# Patient Record
Sex: FEMALE | Race: WHITE | NOT HISPANIC OR LATINO | Employment: OTHER | URBAN - METROPOLITAN AREA
[De-identification: names, ages, dates, MRNs, and addresses within clinical notes are randomized per-mention and may not be internally consistent; named-entity substitution may affect disease eponyms.]

---

## 2017-11-07 ENCOUNTER — HOSPITAL ENCOUNTER (OUTPATIENT)
Facility: MEDICAL CENTER | Age: 72
End: 2017-11-09
Attending: EMERGENCY MEDICINE | Admitting: INTERNAL MEDICINE
Payer: MEDICARE

## 2017-11-07 ENCOUNTER — RESOLUTE PROFESSIONAL BILLING HOSPITAL PROF FEE (OUTPATIENT)
Dept: HOSPITALIST | Facility: MEDICAL CENTER | Age: 72
End: 2017-11-07
Payer: MEDICARE

## 2017-11-07 ENCOUNTER — APPOINTMENT (OUTPATIENT)
Dept: RADIOLOGY | Facility: MEDICAL CENTER | Age: 72
End: 2017-11-07
Attending: EMERGENCY MEDICINE
Payer: MEDICARE

## 2017-11-07 DIAGNOSIS — G45.8 OTHER SPECIFIED TRANSIENT CEREBRAL ISCHEMIAS: ICD-10-CM

## 2017-11-07 DIAGNOSIS — E86.0 DEHYDRATION: ICD-10-CM

## 2017-11-07 DIAGNOSIS — E87.1 HYPONATREMIA: ICD-10-CM

## 2017-11-07 DIAGNOSIS — R55 SYNCOPE, UNSPECIFIED SYNCOPE TYPE: ICD-10-CM

## 2017-11-07 LAB
ALBUMIN SERPL BCP-MCNC: 4 G/DL (ref 3.2–4.9)
ALBUMIN/GLOB SERPL: 1.4 G/DL
ALP SERPL-CCNC: 46 U/L (ref 30–99)
ALT SERPL-CCNC: 14 U/L (ref 2–50)
ANION GAP SERPL CALC-SCNC: 11 MMOL/L (ref 0–11.9)
APPEARANCE UR: CLEAR
APPEARANCE UR: CLEAR
APTT PPP: 29.3 SEC (ref 24.7–36)
AST SERPL-CCNC: 18 U/L (ref 12–45)
BASOPHILS # BLD AUTO: 0.5 % (ref 0–1.8)
BASOPHILS # BLD: 0.04 K/UL (ref 0–0.12)
BILIRUB SERPL-MCNC: 0.8 MG/DL (ref 0.1–1.5)
BILIRUB UR QL STRIP.AUTO: NEGATIVE
BNP SERPL-MCNC: 12 PG/ML (ref 0–100)
BUN SERPL-MCNC: 27 MG/DL (ref 8–22)
CALCIUM SERPL-MCNC: 8.9 MG/DL (ref 8.5–10.5)
CHLORIDE SERPL-SCNC: 98 MMOL/L (ref 96–112)
CO2 SERPL-SCNC: 18 MMOL/L (ref 20–33)
COLOR UR AUTO: YELLOW
COLOR UR: YELLOW
CREAT SERPL-MCNC: 1.23 MG/DL (ref 0.5–1.4)
EKG IMPRESSION: NORMAL
EOSINOPHIL # BLD AUTO: 0.03 K/UL (ref 0–0.51)
EOSINOPHIL NFR BLD: 0.4 % (ref 0–6.9)
ERYTHROCYTE [DISTWIDTH] IN BLOOD BY AUTOMATED COUNT: 37.2 FL (ref 35.9–50)
EST. AVERAGE GLUCOSE BLD GHB EST-MCNC: 166 MG/DL
GFR SERPL CREATININE-BSD FRML MDRD: 43 ML/MIN/1.73 M 2
GLOBULIN SER CALC-MCNC: 2.8 G/DL (ref 1.9–3.5)
GLUCOSE BLD-MCNC: 209 MG/DL (ref 65–99)
GLUCOSE SERPL-MCNC: 207 MG/DL (ref 65–99)
GLUCOSE UR QL STRIP.AUTO: NEGATIVE MG/DL
GLUCOSE UR STRIP.AUTO-MCNC: NEGATIVE MG/DL
HBA1C MFR BLD: 7.4 % (ref 0–5.6)
HCT VFR BLD AUTO: 31.3 % (ref 37–47)
HGB BLD-MCNC: 11 G/DL (ref 12–16)
IMM GRANULOCYTES # BLD AUTO: 0.04 K/UL (ref 0–0.11)
IMM GRANULOCYTES NFR BLD AUTO: 0.5 % (ref 0–0.9)
INR PPP: 1.06 (ref 0.87–1.13)
KETONES UR QL STRIP.AUTO: 40 MG/DL
KETONES UR STRIP.AUTO-MCNC: 40 MG/DL
LEUKOCYTE ESTERASE UR QL STRIP.AUTO: NEGATIVE
LEUKOCYTE ESTERASE UR QL STRIP.AUTO: NEGATIVE
LIPASE SERPL-CCNC: 26 U/L (ref 11–82)
LYMPHOCYTES # BLD AUTO: 1.04 K/UL (ref 1–4.8)
LYMPHOCYTES NFR BLD: 12.5 % (ref 22–41)
MCH RBC QN AUTO: 30.7 PG (ref 27–33)
MCHC RBC AUTO-ENTMCNC: 35.1 G/DL (ref 33.6–35)
MCV RBC AUTO: 87.4 FL (ref 81.4–97.8)
MICRO URNS: ABNORMAL
MONOCYTES # BLD AUTO: 0.45 K/UL (ref 0–0.85)
MONOCYTES NFR BLD AUTO: 5.4 % (ref 0–13.4)
NEUTROPHILS # BLD AUTO: 6.71 K/UL (ref 2–7.15)
NEUTROPHILS NFR BLD: 80.7 % (ref 44–72)
NITRITE UR QL STRIP.AUTO: NEGATIVE
NITRITE UR QL STRIP.AUTO: NEGATIVE
NRBC # BLD AUTO: 0 K/UL
NRBC BLD AUTO-RTO: 0 /100 WBC
PH UR STRIP.AUTO: 7 [PH]
PH UR STRIP.AUTO: 7 [PH]
PLATELET # BLD AUTO: 296 K/UL (ref 164–446)
PMV BLD AUTO: 9.7 FL (ref 9–12.9)
POTASSIUM SERPL-SCNC: 4.5 MMOL/L (ref 3.6–5.5)
PROT SERPL-MCNC: 6.8 G/DL (ref 6–8.2)
PROT UR QL STRIP: NEGATIVE MG/DL
PROT UR QL STRIP: NEGATIVE MG/DL
PROTHROMBIN TIME: 13.5 SEC (ref 12–14.6)
RBC # BLD AUTO: 3.58 M/UL (ref 4.2–5.4)
RBC UR QL AUTO: NEGATIVE
RBC UR QL AUTO: NEGATIVE
SODIUM SERPL-SCNC: 127 MMOL/L (ref 135–145)
SP GR UR STRIP.AUTO: 1.01
SP GR UR: 1.01
TROPONIN I SERPL-MCNC: 0.02 NG/ML (ref 0–0.04)
TSH SERPL DL<=0.005 MIU/L-ACNC: 0.49 UIU/ML (ref 0.3–3.7)
UROBILINOGEN UR STRIP.AUTO-MCNC: 0.2 MG/DL
WBC # BLD AUTO: 8.3 K/UL (ref 4.8–10.8)

## 2017-11-07 PROCEDURE — 82962 GLUCOSE BLOOD TEST: CPT

## 2017-11-07 PROCEDURE — 700105 HCHG RX REV CODE 258: Performed by: EMERGENCY MEDICINE

## 2017-11-07 PROCEDURE — 81002 URINALYSIS NONAUTO W/O SCOPE: CPT | Mod: 59

## 2017-11-07 PROCEDURE — G0378 HOSPITAL OBSERVATION PER HR: HCPCS

## 2017-11-07 PROCEDURE — 83690 ASSAY OF LIPASE: CPT

## 2017-11-07 PROCEDURE — 85610 PROTHROMBIN TIME: CPT

## 2017-11-07 PROCEDURE — 70450 CT HEAD/BRAIN W/O DYE: CPT

## 2017-11-07 PROCEDURE — 99285 EMERGENCY DEPT VISIT HI MDM: CPT

## 2017-11-07 PROCEDURE — 83880 ASSAY OF NATRIURETIC PEPTIDE: CPT

## 2017-11-07 PROCEDURE — 71010 DX-CHEST-LIMITED (1 VIEW): CPT

## 2017-11-07 PROCEDURE — 84484 ASSAY OF TROPONIN QUANT: CPT

## 2017-11-07 PROCEDURE — 93005 ELECTROCARDIOGRAM TRACING: CPT | Performed by: EMERGENCY MEDICINE

## 2017-11-07 PROCEDURE — 94760 N-INVAS EAR/PLS OXIMETRY 1: CPT

## 2017-11-07 PROCEDURE — 80053 COMPREHEN METABOLIC PANEL: CPT

## 2017-11-07 PROCEDURE — 700102 HCHG RX REV CODE 250 W/ 637 OVERRIDE(OP): Performed by: INTERNAL MEDICINE

## 2017-11-07 PROCEDURE — 36415 COLL VENOUS BLD VENIPUNCTURE: CPT

## 2017-11-07 PROCEDURE — 93005 ELECTROCARDIOGRAM TRACING: CPT

## 2017-11-07 PROCEDURE — 99220 PR INITIAL OBSERVATION CARE,LEVL III: CPT | Mod: AI | Performed by: INTERNAL MEDICINE

## 2017-11-07 PROCEDURE — 83036 HEMOGLOBIN GLYCOSYLATED A1C: CPT

## 2017-11-07 PROCEDURE — 85025 COMPLETE CBC W/AUTO DIFF WBC: CPT

## 2017-11-07 PROCEDURE — 700111 HCHG RX REV CODE 636 W/ 250 OVERRIDE (IP): Performed by: INTERNAL MEDICINE

## 2017-11-07 PROCEDURE — 84443 ASSAY THYROID STIM HORMONE: CPT

## 2017-11-07 PROCEDURE — 85730 THROMBOPLASTIN TIME PARTIAL: CPT

## 2017-11-07 PROCEDURE — 81003 URINALYSIS AUTO W/O SCOPE: CPT

## 2017-11-07 PROCEDURE — 700105 HCHG RX REV CODE 258: Performed by: INTERNAL MEDICINE

## 2017-11-07 PROCEDURE — A9270 NON-COVERED ITEM OR SERVICE: HCPCS | Performed by: INTERNAL MEDICINE

## 2017-11-07 RX ORDER — BUDESONIDE AND FORMOTEROL FUMARATE DIHYDRATE 160; 4.5 UG/1; UG/1
2 AEROSOL RESPIRATORY (INHALATION)
Status: DISCONTINUED | OUTPATIENT
Start: 2017-11-07 | End: 2017-11-09 | Stop reason: HOSPADM

## 2017-11-07 RX ORDER — ATORVASTATIN CALCIUM 10 MG/1
10 TABLET, FILM COATED ORAL EVERY EVENING
Status: DISCONTINUED | OUTPATIENT
Start: 2017-11-07 | End: 2017-11-09 | Stop reason: HOSPADM

## 2017-11-07 RX ORDER — LISINOPRIL 10 MG/1
10 TABLET ORAL DAILY
COMMUNITY
End: 2023-11-09

## 2017-11-07 RX ORDER — SODIUM CHLORIDE 9 MG/ML
1000 INJECTION, SOLUTION INTRAVENOUS ONCE
Status: COMPLETED | OUTPATIENT
Start: 2017-11-07 | End: 2017-11-07

## 2017-11-07 RX ORDER — LEVOCETIRIZINE DIHYDROCHLORIDE 5 MG/1
5 TABLET, FILM COATED ORAL DAILY
COMMUNITY
End: 2021-10-27

## 2017-11-07 RX ORDER — AMOXICILLIN 250 MG
2 CAPSULE ORAL 2 TIMES DAILY
Status: DISCONTINUED | OUTPATIENT
Start: 2017-11-07 | End: 2017-11-09 | Stop reason: HOSPADM

## 2017-11-07 RX ORDER — LEVOTHYROXINE SODIUM 112 UG/1
88 TABLET ORAL
COMMUNITY

## 2017-11-07 RX ORDER — ACETAMINOPHEN 325 MG/1
650 TABLET ORAL EVERY 6 HOURS PRN
Status: DISCONTINUED | OUTPATIENT
Start: 2017-11-07 | End: 2017-11-09 | Stop reason: HOSPADM

## 2017-11-07 RX ORDER — DEXTROSE MONOHYDRATE 25 G/50ML
25 INJECTION, SOLUTION INTRAVENOUS
Status: DISCONTINUED | OUTPATIENT
Start: 2017-11-07 | End: 2017-11-09 | Stop reason: HOSPADM

## 2017-11-07 RX ORDER — HEPARIN SODIUM 5000 [USP'U]/ML
5000 INJECTION, SOLUTION INTRAVENOUS; SUBCUTANEOUS EVERY 8 HOURS
Status: DISCONTINUED | OUTPATIENT
Start: 2017-11-07 | End: 2017-11-09 | Stop reason: HOSPADM

## 2017-11-07 RX ORDER — BISACODYL 10 MG
10 SUPPOSITORY, RECTAL RECTAL
Status: DISCONTINUED | OUTPATIENT
Start: 2017-11-07 | End: 2017-11-09 | Stop reason: HOSPADM

## 2017-11-07 RX ORDER — LEVOTHYROXINE SODIUM 112 UG/1
112 TABLET ORAL
Status: DISCONTINUED | OUTPATIENT
Start: 2017-11-08 | End: 2017-11-09 | Stop reason: HOSPADM

## 2017-11-07 RX ORDER — ATORVASTATIN CALCIUM 10 MG/1
10 TABLET, FILM COATED ORAL EVERY EVENING
COMMUNITY
End: 2023-11-09

## 2017-11-07 RX ORDER — POLYETHYLENE GLYCOL 3350 17 G/17G
1 POWDER, FOR SOLUTION ORAL
Status: DISCONTINUED | OUTPATIENT
Start: 2017-11-07 | End: 2017-11-09 | Stop reason: HOSPADM

## 2017-11-07 RX ORDER — LEVOCETIRIZINE DIHYDROCHLORIDE 5 MG/1
5 TABLET, FILM COATED ORAL DAILY
Status: DISCONTINUED | OUTPATIENT
Start: 2017-11-07 | End: 2017-11-07

## 2017-11-07 RX ORDER — FLUTICASONE PROPIONATE 50 MCG
2 SPRAY, SUSPENSION (ML) NASAL DAILY
COMMUNITY
End: 2021-10-27

## 2017-11-07 RX ORDER — LABETALOL HYDROCHLORIDE 5 MG/ML
10 INJECTION, SOLUTION INTRAVENOUS EVERY 4 HOURS PRN
Status: DISCONTINUED | OUTPATIENT
Start: 2017-11-07 | End: 2017-11-09 | Stop reason: HOSPADM

## 2017-11-07 RX ORDER — SODIUM CHLORIDE 9 MG/ML
INJECTION, SOLUTION INTRAVENOUS CONTINUOUS
Status: DISCONTINUED | OUTPATIENT
Start: 2017-11-07 | End: 2017-11-08

## 2017-11-07 RX ADMIN — ATORVASTATIN CALCIUM 10 MG: 10 TABLET, FILM COATED ORAL at 21:03

## 2017-11-07 RX ADMIN — SODIUM CHLORIDE: 9 INJECTION, SOLUTION INTRAVENOUS at 21:02

## 2017-11-07 RX ADMIN — INSULIN LISPRO 2 UNITS: 100 INJECTION, SOLUTION INTRAVENOUS; SUBCUTANEOUS at 21:33

## 2017-11-07 RX ADMIN — HEPARIN SODIUM 5000 UNITS: 5000 INJECTION, SOLUTION INTRAVENOUS; SUBCUTANEOUS at 21:02

## 2017-11-07 RX ADMIN — SODIUM CHLORIDE 1000 ML: 9 INJECTION, SOLUTION INTRAVENOUS at 17:37

## 2017-11-07 ASSESSMENT — LIFESTYLE VARIABLES
EVER_SMOKED: YES
DO YOU DRINK ALCOHOL: NO
ALCOHOL_USE: NO

## 2017-11-07 ASSESSMENT — COGNITIVE AND FUNCTIONAL STATUS - GENERAL
MOBILITY SCORE: 23
DAILY ACTIVITIY SCORE: 24
CLIMB 3 TO 5 STEPS WITH RAILING: A LITTLE
SUGGESTED CMS G CODE MODIFIER MOBILITY: CI
SUGGESTED CMS G CODE MODIFIER DAILY ACTIVITY: CH

## 2017-11-07 ASSESSMENT — PATIENT HEALTH QUESTIONNAIRE - PHQ9
SUM OF ALL RESPONSES TO PHQ QUESTIONS 1-9: 0
2. FEELING DOWN, DEPRESSED, IRRITABLE, OR HOPELESS: NOT AT ALL
1. LITTLE INTEREST OR PLEASURE IN DOING THINGS: NOT AT ALL
SUM OF ALL RESPONSES TO PHQ9 QUESTIONS 1 AND 2: 0

## 2017-11-07 ASSESSMENT — PAIN SCALES - GENERAL
PAINLEVEL_OUTOF10: 0
PAINLEVEL_OUTOF10: 2
PAINLEVEL_OUTOF10: 0

## 2017-11-08 ENCOUNTER — APPOINTMENT (OUTPATIENT)
Dept: RADIOLOGY | Facility: MEDICAL CENTER | Age: 72
End: 2017-11-08
Attending: PSYCHIATRY & NEUROLOGY
Payer: MEDICARE

## 2017-11-08 PROBLEM — R11.10 VOMITING: Status: ACTIVE | Noted: 2017-11-08

## 2017-11-08 PROBLEM — D32.9 MENINGIOMA (HCC): Status: ACTIVE | Noted: 2017-11-08

## 2017-11-08 LAB
ANION GAP SERPL CALC-SCNC: 6 MMOL/L (ref 0–11.9)
BASOPHILS # BLD AUTO: 0.7 % (ref 0–1.8)
BASOPHILS # BLD: 0.05 K/UL (ref 0–0.12)
BUN SERPL-MCNC: 25 MG/DL (ref 8–22)
CALCIUM SERPL-MCNC: 7.9 MG/DL (ref 8.5–10.5)
CHLORIDE SERPL-SCNC: 109 MMOL/L (ref 96–112)
CO2 SERPL-SCNC: 21 MMOL/L (ref 20–33)
CREAT SERPL-MCNC: 1.24 MG/DL (ref 0.5–1.4)
EOSINOPHIL # BLD AUTO: 0.09 K/UL (ref 0–0.51)
EOSINOPHIL NFR BLD: 1.3 % (ref 0–6.9)
ERYTHROCYTE [DISTWIDTH] IN BLOOD BY AUTOMATED COUNT: 39.6 FL (ref 35.9–50)
GFR SERPL CREATININE-BSD FRML MDRD: 42 ML/MIN/1.73 M 2
GLUCOSE BLD-MCNC: 104 MG/DL (ref 65–99)
GLUCOSE BLD-MCNC: 151 MG/DL (ref 65–99)
GLUCOSE SERPL-MCNC: 93 MG/DL (ref 65–99)
HCT VFR BLD AUTO: 28.8 % (ref 37–47)
HGB BLD-MCNC: 9.6 G/DL (ref 12–16)
IMM GRANULOCYTES # BLD AUTO: 0.02 K/UL (ref 0–0.11)
IMM GRANULOCYTES NFR BLD AUTO: 0.3 % (ref 0–0.9)
LYMPHOCYTES # BLD AUTO: 1.8 K/UL (ref 1–4.8)
LYMPHOCYTES NFR BLD: 25.7 % (ref 22–41)
MCH RBC QN AUTO: 30.4 PG (ref 27–33)
MCHC RBC AUTO-ENTMCNC: 33.3 G/DL (ref 33.6–35)
MCV RBC AUTO: 91.1 FL (ref 81.4–97.8)
MONOCYTES # BLD AUTO: 0.66 K/UL (ref 0–0.85)
MONOCYTES NFR BLD AUTO: 9.4 % (ref 0–13.4)
NEUTROPHILS # BLD AUTO: 4.39 K/UL (ref 2–7.15)
NEUTROPHILS NFR BLD: 62.6 % (ref 44–72)
NRBC # BLD AUTO: 0 K/UL
NRBC BLD AUTO-RTO: 0 /100 WBC
PLATELET # BLD AUTO: 288 K/UL (ref 164–446)
PMV BLD AUTO: 9.8 FL (ref 9–12.9)
POTASSIUM SERPL-SCNC: 4.1 MMOL/L (ref 3.6–5.5)
RBC # BLD AUTO: 3.16 M/UL (ref 4.2–5.4)
SODIUM SERPL-SCNC: 136 MMOL/L (ref 135–145)
WBC # BLD AUTO: 7 K/UL (ref 4.8–10.8)

## 2017-11-08 PROCEDURE — 99226 PR SUBSEQUENT OBSERVATION CARE,LEVEL III: CPT | Performed by: INTERNAL MEDICINE

## 2017-11-08 PROCEDURE — 80048 BASIC METABOLIC PNL TOTAL CA: CPT

## 2017-11-08 PROCEDURE — 700111 HCHG RX REV CODE 636 W/ 250 OVERRIDE (IP): Performed by: INTERNAL MEDICINE

## 2017-11-08 PROCEDURE — 93880 EXTRACRANIAL BILAT STUDY: CPT | Mod: 26 | Performed by: SURGERY

## 2017-11-08 PROCEDURE — 82962 GLUCOSE BLOOD TEST: CPT

## 2017-11-08 PROCEDURE — 93880 EXTRACRANIAL BILAT STUDY: CPT

## 2017-11-08 PROCEDURE — 700102 HCHG RX REV CODE 250 W/ 637 OVERRIDE(OP): Performed by: INTERNAL MEDICINE

## 2017-11-08 PROCEDURE — 85025 COMPLETE CBC W/AUTO DIFF WBC: CPT

## 2017-11-08 PROCEDURE — A9270 NON-COVERED ITEM OR SERVICE: HCPCS | Performed by: INTERNAL MEDICINE

## 2017-11-08 PROCEDURE — 700105 HCHG RX REV CODE 258: Performed by: INTERNAL MEDICINE

## 2017-11-08 PROCEDURE — 90471 IMMUNIZATION ADMIN: CPT

## 2017-11-08 PROCEDURE — 36415 COLL VENOUS BLD VENIPUNCTURE: CPT

## 2017-11-08 PROCEDURE — G0378 HOSPITAL OBSERVATION PER HR: HCPCS

## 2017-11-08 PROCEDURE — 90670 PCV13 VACCINE IM: CPT | Performed by: INTERNAL MEDICINE

## 2017-11-08 PROCEDURE — 94760 N-INVAS EAR/PLS OXIMETRY 1: CPT

## 2017-11-08 PROCEDURE — 70551 MRI BRAIN STEM W/O DYE: CPT

## 2017-11-08 RX ORDER — ALBUTEROL SULFATE 90 UG/1
2 AEROSOL, METERED RESPIRATORY (INHALATION) EVERY 4 HOURS PRN
Status: DISCONTINUED | OUTPATIENT
Start: 2017-11-08 | End: 2017-11-09 | Stop reason: HOSPADM

## 2017-11-08 RX ORDER — DIPHENHYDRAMINE HCL 25 MG
25 TABLET ORAL EVERY 6 HOURS PRN
Status: COMPLETED | OUTPATIENT
Start: 2017-11-08 | End: 2017-11-08

## 2017-11-08 RX ADMIN — INSULIN LISPRO 1 UNITS: 100 INJECTION, SOLUTION INTRAVENOUS; SUBCUTANEOUS at 20:48

## 2017-11-08 RX ADMIN — DIPHENHYDRAMINE HCL 25 MG: 25 TABLET ORAL at 15:23

## 2017-11-08 RX ADMIN — ACETAMINOPHEN 650 MG: 325 TABLET, FILM COATED ORAL at 15:24

## 2017-11-08 RX ADMIN — PNEUMOCOCCAL 13-VALENT CONJUGATE VACCINE 0.5 ML: 2.2; 2.2; 2.2; 2.2; 2.2; 4.4; 2.2; 2.2; 2.2; 2.2; 2.2; 2.2; 2.2 INJECTION, SUSPENSION INTRAMUSCULAR at 05:29

## 2017-11-08 RX ADMIN — SODIUM CHLORIDE: 9 INJECTION, SOLUTION INTRAVENOUS at 05:32

## 2017-11-08 RX ADMIN — LEVOTHYROXINE SODIUM 112 MCG: 112 TABLET ORAL at 05:46

## 2017-11-08 RX ADMIN — ATORVASTATIN CALCIUM 10 MG: 10 TABLET, FILM COATED ORAL at 20:41

## 2017-11-08 RX ADMIN — BUDESONIDE AND FORMOTEROL FUMARATE DIHYDRATE 2 PUFF: 160; 4.5 AEROSOL RESPIRATORY (INHALATION) at 08:41

## 2017-11-08 ASSESSMENT — ENCOUNTER SYMPTOMS
WEIGHT LOSS: 0
DIARRHEA: 0
VOMITING: 0
DOUBLE VISION: 0
FEVER: 0
FOCAL WEAKNESS: 0
DIZZINESS: 0
NAUSEA: 0
BLURRED VISION: 0
ABDOMINAL PAIN: 0
WEAKNESS: 0
CHILLS: 0
COUGH: 0
SHORTNESS OF BREATH: 0
PALPITATIONS: 0
HEADACHES: 0

## 2017-11-08 ASSESSMENT — PAIN SCALES - GENERAL
PAINLEVEL_OUTOF10: 0

## 2017-11-08 ASSESSMENT — COPD QUESTIONNAIRES
COPD SCREENING SCORE: 6
HAVE YOU SMOKED AT LEAST 100 CIGARETTES IN YOUR ENTIRE LIFE: YES
DO YOU EVER COUGH UP ANY MUCUS OR PHLEGM?: YES, A FEW DAYS A WEEK OR MONTH
DURING THE PAST 4 WEEKS HOW MUCH DID YOU FEEL SHORT OF BREATH: SOME OF THE TIME

## 2017-11-08 ASSESSMENT — LIFESTYLE VARIABLES: EVER_SMOKED: YES

## 2017-11-08 NOTE — CARE PLAN
Problem: Venous Thromboembolism (VTW)/Deep Vein Thrombosis (DVT) Prevention:  Goal: Patient will participate in Venous Thrombosis (VTE)/Deep Vein Thrombosis (DVT)Prevention Measures  Outcome: PROGRESSING AS EXPECTED  Pt receiving heparin for VTE.

## 2017-11-08 NOTE — ED NOTES
Pt BIB EMS from home for syncopal event. Pt reports she has been vomiting since yesterday and while in her room she had a syncopal event. Pt reports she has been weak the while day. Tingling to right hand since fall. HX of Carpal tunnel

## 2017-11-08 NOTE — PROGRESS NOTES
Assumed pt care at 2020. Received bedside report from RN. PM assessment completed. AAOx4. Pt denies SOB; c/o pain of 0 on 0 to 10 pain scale (Will administer medication PRN - see MAR). Provided pt with RN and CNA extension numbers on white board and encouraged pt to call when needed. Discussed plan of care for the night, pt verbalizes understanding. VSS. Denies any additional needs at this time. Call light, belongings, and phone within reach. Hourly rounding in effect.

## 2017-11-08 NOTE — ED PROVIDER NOTES
ED Provider Note    CHIEF COMPLAINT  Chief Complaint   Patient presents with   • Syncope       HPI  Martha Reyes is a 72 y.o. female who presents To the ER following a syncopal episode.  The patient has felt sick for the last 2 days.  She mostly had nausea, vomiting and poor by mouth.  She vomited about 5 times, is not had much to eat or drink.  She is felt progressively more weak.  She had no fevers or chills.  She's had a mild associated cough, no diarrhea or constipation.  No dysuria, hematuria, increased urinary frequency.  Today, she didn't eat much, she went upstairs to be some ironing, she bent down to pick something up in her vision went gray.  She then held onto the wall and a washing machine and that helped herself due to her bedroom where she passed out and woke up on the floor.  She landed on her left side evidence of some right-sided weakness and numbness.  Denies any head or neck.  Numbness and weakness has subjectively improved.  No other numbness or tingling or weakness.  History of stroke.  No other acute concerns or complaints.    REVIEW OF SYSTEMS  See HPI for further details. All other systems are negative.    PAST MEDICAL HISTORY  Past Medical History:   Diagnosis Date   • Asthma    • Carpal tunnel syndrome    • Diabetes (CMS-[facility])        FAMILY HISTORY  History reviewed. No pertinent family history.    SOCIAL HISTORY  Social History     Social History   • Marital status: N/A     Spouse name: N/A   • Number of children: N/A   • Years of education: N/A     Social History Main Topics   • Smoking status: Former Smoker   • Smokeless tobacco: Never Used   • Alcohol use Yes   • Drug use: No   • Sexual activity: Not on file     Other Topics Concern   • Not on file     Social History Narrative   • No narrative on file       SURGICAL HISTORY  Past Surgical History:   Procedure Laterality Date   • GYN SURGERY     • OTHER ORTHOPEDIC SURGERY      back   • THYROIDECTOMY TOTAL         CURRENT MEDICATIONS  Home  "Medications    **Home medications have not yet been reviewed for this encounter**         ALLERGIES  No Known Allergies    PHYSICAL EXAM  VITAL SIGNS: /60   Pulse 67   Temp 37 °C (98.6 °F)   Resp 18   Ht 1.676 m (5' 6\")   Wt 68 kg (150 lb)   BMI 24.21 kg/m²    Awake, alert, ill-appearing, no acute distress.  Constitutional:.   HENT: Normocephalic, Atraumatic, Bilateral external ears normal, Oropharynx moist, No oral exudates, Nose normal.   Eyes: PERRL, EOMI, Conjunctiva normal, No discharge.   Neck: Normal range of motion, No tenderness, Supple, No stridor.   Lymphatic: No lymphadenopathy noted.   Cardiovascular: Normal heart rate, Normal rhythm, No murmurs, No rubs, No gallops.   Thorax & Lungs: Normal breath sounds, No respiratory distress, No wheezing, No chest tenderness.   Abdomen: Bowel sounds normal, Soft, No tenderness,   Skin: Warm, Dry, No erythema, No rash.   Back: No tenderness, No CVA tenderness.   Musculoskeletal: Good range of motion in all major joints.   Neurologic: Alert & oriented x 3, she moves her facial muscles symmetrically, she has weakness the right upper and right lower extremity and subtle pronator drift on the right.  Subjective and objective numbness.  Only on the right side.  Psychiatric: Affect normal,     EKG  EKG Interpretation    Interpreted by me    Rhythm: normal sinus   Rate: normal  Axis: normal  Ectopy: none  Conduction: normal except a prolonged ME interval.  ST Segments: no acute change  T Waves: no acute change  Q Waves: none    Clinical Impression: Normal sinus rhythm, prolonged ME interval.  No other acute abnormality.    RADIOLOGY/PROCEDURES  CT-HEAD W/O   Final Result      1.  No acute intracranial findings.      2.  Calcified extra-axial mass in the right parietal region is consistent with a small meningioma.         DX-CHEST-LIMITED (1 VIEW)   Final Result      No pulmonary consolidation.            COURSE & MEDICAL DECISION MAKING  Pertinent Labs & " Imaging studies reviewed. (See chart for details)  She presents with a single episode weakness on the right side of her body.    Patient syncope sounds like it is secondary to volume Depletion.  She has had poor by mouth and vomiting for a couple of days and then had what sounds orthostatic syncope.  She is workup with labs and an x-ray and found to be dehydrated clinically and hyponatremia.  She treated with 1 L of normal saline IV because of her dehydration and she'll be admitted for continued hydration and electrolyte abnormalities.    The patient also had right-sided weakness after her syncopal episode.  She still some right-sided weakness on my exam, is very subtle.  Her NIH score for me is 0.  Because of her focal and unilateral neurologic deficit.  I spoke with Dr. Adams on-call for neurology for possible stroke.    He came and saw the patient immediately felt the patient was not a candidate for alteplase because the patient's NIH score was 0 and she is minimal symptoms are not clearly from a stroke.  This is reasonable.    The patient will be worked up for a TIA with echocardiogram and MRI.  Should be admitted to the hospital for continued workup and treatment.      FINAL IMPRESSION  1. Syncope, unspecified syncope type    2. Other specified transient cerebral ischemias    3. Hyponatremia    4. Dehydration        2.   3.         Electronically signed by: Loc Andrade, 11/7/2017 5:08 PM

## 2017-11-08 NOTE — CONSULTS
DATE OF SERVICE:  11/07/2017    REQUESTING PHYSICIAN:  Dr. Loc Andrade.    REASON FOR CONSULTATION:  Syncopal episode associated with right-sided   numbness.    HISTORY OF PRESENT ILLNESS:  The patient is a 72-year-old right-handed female   with past medical history significant for type 2 diabetes, asthma, and carpal   tunnel syndrome on the right hand, who was brought to the emergency room after   she was found unresponsive in her room by her daughter.  Apparently, she has   had some nausea, vomiting since yesterday.  Today almost 2 hours before   presentation to emergency room, the patient went upstairs to her room and a   couple of minutes later, her daughter went to check on her and found her on   the floor unresponsive.  The patient has no recollection of this event, but   she tells me when she went upstairs, she felt very dizzy.  She did lean to a   washer machine for a while and then felt better and walked to her room and   that is where she fell and lost consciousness.  There was no loss of urine or   bowel.  There was no tongue biting, no abnormal movement noted.  Subsequently,   after the patient regained consciousness, she complained of numbness of her   right upper and lower extremity.  She has carpal tunnel syndrome on the right   side and has had some numbness in the right arm, but this is worse and new in   her right lower extremity.  She underwent a brain CT in the emergency room   which revealed no evidence of acute abnormalities.  There was a small   calcified extraaxial mass in the right parietal region consistent with a small   meningioma.  The patient denies having any history of previous stroke, heart   attack, or syncopal episode.    PAST MEDICAL HISTORY:  Significant for asthma, history of diabetes and carpal   tunnel syndrome on the right.    MEDICATIONS:  Reviewed as per MAR.    ALLERGIES:  No known drug allergy.    SOCIAL HISTORY:  She has no history of smoking.  She drinks socially but    rarely.  Has no history of drug abuse.    FAMILY HISTORY:  Reviewed and noncontributory.  There is no history of   premature heart attack or stroke.    REVIEW OF SYSTEMS:  As above.  All other systems are normal.  Please also see   Dr. Andrade's review of systems and his H and P.    PHYSICAL EXAMINATION:  VITAL SIGNS:  Today, heart rate 62 and regular, blood pressure 124/47,   respiration 18, O2 sat 100% on room air, and temperature 37.  NECK:  Supple, no carotid bruit.  CARDIOVASCULAR:  Regular rate and rhythm.  LUNGS:  Clear.  ABDOMEN:  Soft.  EXTREMITIES:  No cyanosis or clubbing.  NEUROLOGIC:  She is awake, alert, fully oriented.  Speech and memory within   normal limits.  Facial motor and sensation intact.  Extraocular movements are   full.  Visual fields are full to confrontation.  Hearing is intact to finger   rub bilaterally.  Tongue midline and protrudes symmetrically.  Palate elevates   symmetrically.  Shoulder shrug normal.  Motor examination revealed normal   strength to direct testing of both upper and lower extremity, proximal and   distal, although she has a slight subtle weakness in the right upper and lower   extremity compared to the left; however, there is no drift.  Sensation, she   has some subjective sense of decreased sensation in the right upper and lower   extremity, although she did not have any extinction in double stimulation with   her eyes closed.  Coordination intact to finger-to-nose and heel-to-shin   testing.  Deep tendon reflex 1+ and equal.  Plantar reflexes are equivocal.    Total NIH scale score is 0.    ASSESSMENT AND PLAN:  A 72-year-old female who presented with syncopal episode   with loss of consciousness.  She has some numbness and slight weakness of   right upper and lower extremity, although I cannot rule out the possibility of   a small stroke; however, I do not think she is a candidate for administration   of TPA due to NIH scale score of 0 and very subtle and  minimal symptoms.  She   will be admitted for stroke workup and syncope workup.  We would recommend   brain MRI, EEG, echocardiogram, and carotid ultrasound.  We will check lipid   profile.  She will be placed on aspirin and if her LDL is above 70, we would   recommend starting statin.  She will be evaluated by physical therapy,   occupational therapy, and speech therapy.    Total critical care time spent in the emergency room was 35 minutes.       ____________________________________     MD MHIIR Parks / HEATHER    DD:  11/07/2017 18:21:10  DT:  11/07/2017 18:57:07    D#:  [id number]  Job#:  [id number]

## 2017-11-08 NOTE — PROGRESS NOTES
NEUROLOGY PROGRESS NOTE      Background:  72 y.o. female was admitted on 11/7/2017  3:58 PM for Syncope  Syncope.  She is being followed by Neurology for possible stroke.    SUBJECTIVE: She is doing well, her symptoms have completely resolved. There is no numbness or weakness of extremities.    NEUROLOGICAL EXAM:   Orientation to time, place, and person were normal.  Recent and remote memory were normal.  Attention span and concentration were normal.  Language (naming, repetition, spontaneous speech) was normal.  Fund of knowledge was normal for age and education.  All cranial nerves were normal: Pupils were equally round and reactive to light.  Motor: (tone, bulk and strength): 5/5 strength in both upper and lower extremity, proximal and distal - no abnormal movement noted.  Patient has normal muscle bulk and tone.  Sensation (all modalities) was normal  Reflexes were normal and symmetrical in both upper and lower extremities  Coordination (finger-to-nose, heel/knee/shin, rapid alternating movements) was normal. There was no ataxia, no tremors, and no dysmetria. Station and gait were normal      OBJECTIVE:     NEUROIMAGING:          MEDICATIONS:  Current Facility-Administered Medications   Medication Dose   • albuterol inhaler 2 Puff  2 Puff   • diphenhydrAMINE (BENADRYL) tablet/capsule 25 mg  25 mg   • NS infusion     • atorvastatin (LIPITOR) tablet 10 mg  10 mg   • budesonide-formoterol (SYMBICORT) 160-4.5 MCG/ACT inhaler 2 Puff  2 Puff   • levothyroxine (SYNTHROID) tablet 112 mcg  112 mcg   • senna-docusate (PERICOLACE or SENOKOT S) 8.6-50 MG per tablet 2 Tab  2 Tab    And   • polyethylene glycol/lytes (MIRALAX) PACKET 1 Packet  1 Packet    And   • magnesium hydroxide (MILK OF MAGNESIA) suspension 30 mL  30 mL    And   • bisacodyl (DULCOLAX) suppository 10 mg  10 mg   • Respiratory Care per Protocol     • heparin injection 5,000 Units  5,000 Units   • labetalol (NORMODYNE,TRANDATE) injection 10 mg  10 mg   •  "acetaminophen (TYLENOL) tablet 650 mg  650 mg   • insulin lispro (HUMALOG) injection 1-6 Units  1-6 Units   • glucose 4 g chewable tablet 16 g  16 g    And   • dextrose 50% (D50W) injection 25 mL  25 mL       Blood pressure 124/66, pulse 77, temperature 36.7 °C (98.1 °F), resp. rate 18, height 1.676 m (5' 6\"), weight 71 kg (156 lb 8.4 oz), SpO2 98 %, not currently breastfeeding.    Recent Labs      11/07/17   1611   TROPONINI  0.02   BNPBTYPENAT  12     Recent Labs      11/07/17   1611  11/08/17   0508   WBC  8.3  7.0   RBC  3.58*  3.16*   HEMOGLOBIN  11.0*  9.6*   HEMATOCRIT  31.3*  28.8*   MCV  87.4  91.1   MCH  30.7  30.4   MCHC  35.1*  33.3*   RDW  37.2  39.6   PLATELETCT  296  288   MPV  9.7  9.8     Recent Labs      11/07/17   1611  11/08/17   0508   SODIUM  127*  136   POTASSIUM  4.5  4.1   CHLORIDE  98  109   CO2  18*  21   GLUCOSE  207*  93   BUN  27*  25*       No results found for this or any previous visit.     ASSESSMENT AND PLAN:   A 72-year-old female who presented with syncopal episode   with loss of consciousness.  She had some numbness and slight weakness of   right upper and lower extremity that has now completely resolved.  Awaiting brain MRI, EEG, echocardiogram, and carotid ultrasound.   She will continue with physical therapy,  occupational therapy, and speech therapy.  "

## 2017-11-08 NOTE — ED NOTES
Med rec updated and complete.  Allergies reviewed.  Pt  Denies antibiotic use in last 30 days.  All Am dose taken.

## 2017-11-08 NOTE — ASSESSMENT & PLAN NOTE
Possible vasovagal vs orthostatic hypotension given recent vomiting.  - measure orthostatics  - ambulate  - TTE and carotid US  - neuro following for stroke workup, TTE and MRI brain

## 2017-11-08 NOTE — PROGRESS NOTES
Pt transported to [facility] in [facility] with RN escort. Monitor room notified.

## 2017-11-08 NOTE — PROGRESS NOTES
[facility]    Date of Service: 2017    Chief Complaint  72 y.o. female admitted 2017 with DM, asthma admitted with N/V and syncope.    Interval Problem Update  N/V resolved.  Ambulating without symptoms.    Consultants/Specialty  Neurology    Disposition  Home pending stroke workup        Review of Systems   Constitutional: Negative for chills, fever, malaise/fatigue and weight loss.   Eyes: Negative for blurred vision and double vision.   Respiratory: Negative for cough and shortness of breath.    Cardiovascular: Negative for chest pain, palpitations and leg swelling.   Gastrointestinal: Negative for abdominal pain, diarrhea, nausea and vomiting.   Genitourinary: Negative for dysuria.   Skin: Negative for rash.   Neurological: Negative for dizziness, focal weakness, weakness and headaches.      Physical Exam  Laboratory/Imaging   Hemodynamics  Temp (24hrs), Av.9 °C (98.5 °F), Min:36.3 °C (97.3 °F), Max:37.5 °C (99.5 °F)   Temperature:  (pt at MRI)  Pulse  Av.1  Min: 60  Max: 80 Heart Rate (Monitored): 64  Blood Pressure :  (pt at MRI), NIBP: 109/64      Respiratory      Respiration: 18, Pulse Oximetry:  (pt at MRI), O2 Daily Delivery Respiratory : Room Air with O2 Available             Fluids  No intake or output data in the 24 hours ending 17 1704    Nutrition  Orders Placed This Encounter   Procedures   • Diet Order     Standing Status:   Standing     Number of Occurrences:   1     Order Specific Question:   Diet:     Answer:   Diabetic [3]     Physical Exam   Constitutional: She is oriented to person, place, and time. She appears well-developed and well-nourished. She is cooperative.   HENT:   Head: Normocephalic and atraumatic.   Eyes: Conjunctivae and EOM are normal.   Cardiovascular: Normal rate, regular rhythm and normal heart sounds.    Pulmonary/Chest: Effort normal and breath sounds normal. She has no wheezes. She has no rales.   Abdominal: Soft. Bowel sounds  are normal. There is no tenderness. There is no rebound and no guarding.   Musculoskeletal: She exhibits no edema.   Neurological: She is alert and oriented to person, place, and time.   Cn2-12 intact, normal gait, equal strength in all extremities   Skin: Skin is warm and dry.       Recent Labs      11/07/17   1611  11/08/17   0508   WBC  8.3  7.0   RBC  3.58*  3.16*   HEMOGLOBIN  11.0*  9.6*   HEMATOCRIT  31.3*  28.8*   MCV  87.4  91.1   MCH  30.7  30.4   MCHC  35.1*  33.3*   RDW  37.2  39.6   PLATELETCT  296  288   MPV  9.7  9.8     Recent Labs      11/07/17   1611  11/08/17   0508   SODIUM  127*  136   POTASSIUM  4.5  4.1   CHLORIDE  98  109   CO2  18*  21   GLUCOSE  207*  93   BUN  27*  25*   CREATININE  1.23  1.24   CALCIUM  8.9  7.9*     Recent Labs      11/07/17   1611   APTT  29.3   INR  1.06     Recent Labs      11/07/17   1611   BNPBTYPENAT  12              Assessment/Plan     Vomiting   Assessment & Plan    Resolved, likely viral gastroenteritis        Syncope   Assessment & Plan    Possible vasovagal vs orthostatic hypotension given recent vomiting.  - measure orthostatics  - ambulate  - TTE and carotid US  - neuro following for stroke workup, TTE and MRI brain            Reviewed items::  EKG reviewed, Labs reviewed, Medications reviewed and Radiology images reviewed  Garrido catheter::  No Garrido  DVT prophylaxis pharmacological::  Heparin

## 2017-11-08 NOTE — CARE PLAN
Yadi Saavedra Fall Risk Assessment:     Last Known Fall: Within the last month  Mobility: Dizziness/generalized weakness  Medications: No meds  Mental Status/LOC/Awareness: Awake, alert, and oriented to date, place, and person  Toileting Needs: No needs  Volume/Electrolyte Status: Use of IV fluids/tube feeds  Communication/Sensory: Visual (Glasses)/hearing deficit  Behavior: Appropriate behavior  Yadi Saavedra Fall Risk Total: 10  Fall Risk Level: LOW RISK    Universal Fall Precautions:  call light/belongings in reach, bed in low position and locked, wheelchairs and assistive devices out of sight, siderails up x 2, use non-slip footwear, adequate lighting, clutter free and spill free environment, educate on level of risk, educate to call for assistance    Fall Risk Level Interventions:   TRIAL (TELE 8, NEURO, MED MARGARET 5) Low Fall Risk Interventions  Place yellow fall risk ID band on patient: refused  Provide patient/family education based on risk assessment: completed  Educate patient/family to call staff for assistance when getting out of bed: completed  Place fall precaution signage outside patient door: completed      Patient Specific Interventions:     Medication: review medications with patient and family  Mental Status/LOC/Awareness: not applicable  Toileting: provide frquent toileting  Volume/Electrolyte Status: ensure patient remains hydrated  Communication/Sensory: ensure patient has glasses/contacts and hearing aids/dentures  Behavioral: not applicable  Mobility: ensure bed is locked and in lowest position

## 2017-11-08 NOTE — DIETARY
Nutrition: Day 1 of admit.  71 yo female admitted following syncope.      Assessment:  - Pt is noted on admitting screen to have had poor po intake without any associated weight change.    - Pt reports nausea and emesis with decreased intake x 1 day.   - Seen by neurology and noted her symptoms have completely resolved.   - BMI 25 and albumin 4.0 on admit  - Diabetic diet     Recommendations/Plan:  - encourage intake of meals  - if pt taking less than 50% of most meals consider ordering supplements  - document intake of all meals and supplements as % taken in ADL's to provide interdisciplinary communication across all shifts   - monitor daily weights for adequacy of nutrition and fluid balance  - nutrition representative to see pt daily for meal and snack preferences

## 2017-11-08 NOTE — H&P
CHIEF COMPLAINT:  Syncope, vomiting.    HISTORY OF PRESENT ILLNESS:  This is a 72-year-old female with history of   asthma, type 2 diabetes mellitus, and carpal tunnel syndrome,    Since yesterday, patient started feeling nauseated, with subsequent 5 episodes   of vomiting.  Since then, she had diminished appetite and decreased oral   fluid intake.  She denied any diarrhea, or fevers or chills.  She did not have   any sick contacts or any recent travel.    This morning, she stood up to walk to the laundry room, and then her   vision became blurry. She subsequently passed out.  Her daughter heard her   fall, and rushed to her and tried to wake her up, and patient woke up shortly   back to her baseline mentation.  She was then brought to the ED.    She denied any chest pain, palpitations, or leg edema.  She did complain of   slight shortness of breath.    EMERGENCY DEPARTMENT COURSE:  The patient was initially evaluated in the   emergency department, was maintaining good hemodynamics, saturating well on   room air, and was afebrile.  Initial blood workup showed hemoglobin of 11,   with sodium of 127 and creatinine of 1.23.  GFR was 43.  Troponin was   negative, with BNP low.  Liver function test was normal.  Urinalysis was clean   except for 40 ketones, without any pyuria, nitrite or leukocyte esterase.  CT   of the head was obtained, which did not show any acute intracranial   pathology, except for small meningioma in the right parietal region.  Chest   x-ray (my read) did not show any focal consolidation.  She was given normal   saline in the ED.  She was subsequently admitted to the hospitalist service   for further evaluation and management.    REVIEW OF SYSTEMS  A complete review of system was done. All other systems were negative.    PMH/PSH/FMH: I personally reviewed all ancillary histories as noted.    PAST MEDICAL HISTORY:  Past Medical History:   Diagnosis Date   • Asthma    • Carpal tunnel syndrome    •  Diabetes (CMS-HCC)        PAST SURGICAL HISTORY:  Past Surgical History:   Procedure Laterality Date   • GYN SURGERY     • OTHER ORTHOPEDIC SURGERY      back   • THYROIDECTOMY TOTAL         PERSONAL/SOCIAL HISTORY:  Social History   Substance Use Topics   • Smoking status: Former Smoker   • Smokeless tobacco: Never Used   • Alcohol use Yes       FAMILY MEDICAL HISTORY:  History reviewed. No pertinent family history.    ALLERGIES:  Review of patient's allergies indicates no known allergies.    HOME MEDICATIONS:  Medication Sig Start Date End Date   lisinopril (PRINIVIL) 10 MG Tab Take 10 mg by mouth every day.     levothyroxine (SYNTHROID) 112 MCG Tab Take 112 mcg by mouth Every morning on an empty stomach.     linagliptin (TRADJENTA) 5 MG Tab tablet Take 5 mg by mouth every day.     atorvastatin (LIPITOR) 10 MG Tab Take 10 mg by mouth every evening.     fluticasone-salmeterol (ADVAIR) 250-50 MCG/DOSE AEROSOL POWDER, BREATH ACTIVATED Inhale 1 Puff by mouth every 12 hours.     fluticasone (FLONASE) 50 MCG/ACT nasal spray Spray 2 Sprays in nose every day.     Levocetirizine Dihydrochloride (XYZAL) 5 MG Tab Take 5 mg by mouth every day.             PHYSICAL EXAMINATION:  VITAL SIGNS:  Blood pressure 122/60, heart rate 64, respiratory rate 12,   oxygen saturation 100% on room air, and temperature 37 degrees Celsius.  CONSTITUTIONAL: (-) diaphoresis, (-) distress  HENT:  Dry lips and oral mucosa.  No tonsillopharyngeal congestion or   exudates.  EYES: PERRLA, pink conjuctivae, (-) icteric sclerae  NECK: (-) cervical lymphadenopathy, (-) neck rigidity  CARDIOVASCULAR: Distinct heart sounds, RRR, (-) murmurs, rubs, gallops, (-) LE edema  RESPIRATORY: Equal chest expansion, clear breath sounds, (-) crackles/wheezes/rales/rhonchi  GASTROINTESTINAL: normoactive bowel sounds, soft, (-) tenderness, (-) masses, (-) guarding/rebound  MUSCULOSKELETAL: (-) joint swelling/tenderness, (-) joint deformities, (-) muscle tenderness,    (-) gross limitation of movement of 4 extremities  SKIN: (-) erythema, warmth, rashes, ulcers, open wounds  PSYCHIATRIC: mood, affect, and thought content WNL, behavior age appropriate  NEUROLOGIC: Non-focal, moves all 4 extremities, sensory grossly intact        PERTINENT DIAGNOSTIC RESULTS:  Reviewed, and as mentioned above. Please refer to ED course.      ASSESSMENT:  1.  Syncope, likely secondary to dehydration/volume depletion.  2.  Hypovolemic hyponatremia.  3.  Nausea and vomiting, suspect viral acute gastroenteritis.  4.  Acute renal insufficiency, prerenal.  5.  Anemia.  6.  Asthma, not in acute exacerbation.  7.  Type 2 diabetes mellitus.    PLAN:  -- I will admit her to the telemetry unit.  She is appropriate for observation   level of care.  -- I will rehydrate her with normal saline at 125 mL per hour, and we will   continue to monitor her on telemetry for any occult arrhythmias.  I will   obtain an echocardiogram and carotid ultrasound to complete her syncope   workup.  -- We will follow her sodium level and creatinine with IV fluid resuscitation.    I will ask the nursing staff to obtain orthostatic vital signs now, and in   the morning to assess for adequate rehydration.  -- I will put her on RT protocol, and resume her home dose inhaler for her   asthma.  -- Given her acute renal insufficiency, I will hold her lisinopril for now and   start her on p.r.n. IV labetalol for significant hypertension.  -- I will continue her on symptomatic management with PRN antiemetics.   -- I will also hold her oral hypoglycemic agents for now, and place her on   sliding scale insulin while she is in house.  I will send for hemoglobin A1c.  -- Resume home dose Synthroid.  I will send for TSH.      DVT prophylaxis -- heparin subcutaneously.  GI prophylaxis -- not indicated.  Code status -- full code.       ____________________________________     ANTOINETTE Porras / HEATHER    DD:  11/08/2017 00:04:01  DT:   11/08/2017 00:22:59    D#:  [id number]  Job#:  [id number]

## 2017-11-09 ENCOUNTER — PATIENT OUTREACH (OUTPATIENT)
Dept: HEALTH INFORMATION MANAGEMENT | Facility: OTHER | Age: 72
End: 2017-11-09

## 2017-11-09 VITALS
DIASTOLIC BLOOD PRESSURE: 55 MMHG | HEIGHT: 66 IN | BODY MASS INDEX: 25.26 KG/M2 | HEART RATE: 71 BPM | SYSTOLIC BLOOD PRESSURE: 124 MMHG | OXYGEN SATURATION: 97 % | WEIGHT: 157.19 LBS | RESPIRATION RATE: 18 BRPM | TEMPERATURE: 98.3 F

## 2017-11-09 PROBLEM — R55 SYNCOPE: Status: RESOLVED | Noted: 2017-11-07 | Resolved: 2017-11-09

## 2017-11-09 PROBLEM — R11.10 VOMITING: Status: RESOLVED | Noted: 2017-11-08 | Resolved: 2017-11-09

## 2017-11-09 LAB
ANION GAP SERPL CALC-SCNC: 8 MMOL/L (ref 0–11.9)
BUN SERPL-MCNC: 20 MG/DL (ref 8–22)
CALCIUM SERPL-MCNC: 8.2 MG/DL (ref 8.5–10.5)
CHLORIDE SERPL-SCNC: 107 MMOL/L (ref 96–112)
CO2 SERPL-SCNC: 21 MMOL/L (ref 20–33)
CREAT SERPL-MCNC: 1.31 MG/DL (ref 0.5–1.4)
GFR SERPL CREATININE-BSD FRML MDRD: 40 ML/MIN/1.73 M 2
GLUCOSE BLD-MCNC: 115 MG/DL (ref 65–99)
GLUCOSE BLD-MCNC: 118 MG/DL (ref 65–99)
GLUCOSE BLD-MCNC: 153 MG/DL (ref 65–99)
GLUCOSE SERPL-MCNC: 101 MG/DL (ref 65–99)
LV EJECT FRACT  99904: 70
LV EJECT FRACT MOD 2C 99903: 63.98
LV EJECT FRACT MOD 4C 99902: 75.68
LV EJECT FRACT MOD BP 99901: 77.56
POTASSIUM SERPL-SCNC: 3.9 MMOL/L (ref 3.6–5.5)
SODIUM SERPL-SCNC: 136 MMOL/L (ref 135–145)

## 2017-11-09 PROCEDURE — 700102 HCHG RX REV CODE 250 W/ 637 OVERRIDE(OP): Performed by: INTERNAL MEDICINE

## 2017-11-09 PROCEDURE — 82962 GLUCOSE BLOOD TEST: CPT | Mod: 91

## 2017-11-09 PROCEDURE — G0378 HOSPITAL OBSERVATION PER HR: HCPCS

## 2017-11-09 PROCEDURE — 93306 TTE W/DOPPLER COMPLETE: CPT | Mod: 26 | Performed by: INTERNAL MEDICINE

## 2017-11-09 PROCEDURE — 93306 TTE W/DOPPLER COMPLETE: CPT

## 2017-11-09 PROCEDURE — 99217 PR OBSERVATION CARE DISCHARGE: CPT | Performed by: INTERNAL MEDICINE

## 2017-11-09 PROCEDURE — A9270 NON-COVERED ITEM OR SERVICE: HCPCS | Performed by: INTERNAL MEDICINE

## 2017-11-09 PROCEDURE — 36415 COLL VENOUS BLD VENIPUNCTURE: CPT

## 2017-11-09 PROCEDURE — 80048 BASIC METABOLIC PNL TOTAL CA: CPT

## 2017-11-09 RX ADMIN — INSULIN LISPRO 1 UNITS: 100 INJECTION, SOLUTION INTRAVENOUS; SUBCUTANEOUS at 11:50

## 2017-11-09 RX ADMIN — LEVOTHYROXINE SODIUM 112 MCG: 112 TABLET ORAL at 06:15

## 2017-11-09 RX ADMIN — BUDESONIDE AND FORMOTEROL FUMARATE DIHYDRATE 2 PUFF: 160; 4.5 AEROSOL RESPIRATORY (INHALATION) at 08:51

## 2017-11-09 ASSESSMENT — PAIN SCALES - GENERAL: PAINLEVEL_OUTOF10: 0

## 2017-11-09 ASSESSMENT — LIFESTYLE VARIABLES: DO YOU DRINK ALCOHOL: NO

## 2017-11-09 NOTE — PROGRESS NOTES
NEUROLOGY PROGRESS NOTE      Background:  72 y.o. female was admitted on 11/7/2017  3:58 PM for Syncope  Syncope.  She is being followed by Neurology for possible stroke.    SUBJECTIVE: She is doing well, her symptoms have completely resolved. There is no numbness or weakness of extremities.    NEUROLOGICAL EXAM:   Orientation to time, place, and person were normal.  Recent and remote memory were normal.  Attention span and concentration were normal.  Language (naming, repetition, spontaneous speech) was normal.  Fund of knowledge was normal for age and education.  All cranial nerves were normal: Pupils were equally round and reactive to light.  Motor: (tone, bulk and strength): 5/5 strength in both upper and lower extremity, proximal and distal - no abnormal movement noted.  Patient has normal muscle bulk and tone.  Sensation (all modalities) was normal  Reflexes were normal and symmetrical in both upper and lower extremities  Coordination (finger-to-nose, heel/knee/shin, rapid alternating movements) was normal. There was no ataxia, no tremors, and no dysmetria. Station and gait were normal      OBJECTIVE:     NEUROIMAGING:    Brain MRI in 11/8:  1.  No evidence of acute territorial infarct, intracranial hemorrhage or mass lesion.  2.  Mild diffuse cerebral and cerebellar substance loss.  3.  Mild microangiopathic ischemic change versus demyelination or gliosis.  4.  Right mastoid effusion which could be consistent with mastoiditis in the setting.      MEDICATIONS:  Current Facility-Administered Medications   Medication Dose   • albuterol inhaler 2 Puff  2 Puff   • atorvastatin (LIPITOR) tablet 10 mg  10 mg   • budesonide-formoterol (SYMBICORT) 160-4.5 MCG/ACT inhaler 2 Puff  2 Puff   • levothyroxine (SYNTHROID) tablet 112 mcg  112 mcg   • senna-docusate (PERICOLACE or SENOKOT S) 8.6-50 MG per tablet 2 Tab  2 Tab    And   • polyethylene glycol/lytes (MIRALAX) PACKET 1 Packet  1 Packet    And   • magnesium hydroxide  "(MILK OF MAGNESIA) suspension 30 mL  30 mL    And   • bisacodyl (DULCOLAX) suppository 10 mg  10 mg   • Respiratory Care per Protocol     • heparin injection 5,000 Units  5,000 Units   • labetalol (NORMODYNE,TRANDATE) injection 10 mg  10 mg   • acetaminophen (TYLENOL) tablet 650 mg  650 mg   • insulin lispro (HUMALOG) injection 1-6 Units  1-6 Units   • glucose 4 g chewable tablet 16 g  16 g    And   • dextrose 50% (D50W) injection 25 mL  25 mL       Blood pressure 147/61, pulse 60, temperature 36.9 °C (98.5 °F), resp. rate 15, height 1.676 m (5' 6\"), weight 71.3 kg (157 lb 3 oz), SpO2 99 %, not currently breastfeeding.    Recent Labs      11/07/17   1611   TROPONINI  0.02   BNPBTYPENAT  12     Recent Labs      11/07/17   1611  11/08/17   0508   WBC  8.3  7.0   RBC  3.58*  3.16*   HEMOGLOBIN  11.0*  9.6*   HEMATOCRIT  31.3*  28.8*   MCV  87.4  91.1   MCH  30.7  30.4   MCHC  35.1*  33.3*   RDW  37.2  39.6   PLATELETCT  296  288   MPV  9.7  9.8     Recent Labs      11/07/17   1611  11/08/17   0508  11/09/17   0245   SODIUM  127*  136  136   POTASSIUM  4.5  4.1  3.9   CHLORIDE  98  109  107   CO2  18*  21  21   GLUCOSE  207*  93  101*   BUN  27*  25*  20       No results found for this or any previous visit.     ASSESSMENT AND PLAN:   A 72-year-old female who presented with syncopal episode   with loss of consciousness.  She had some numbness and slight weakness of   right upper and lower extremity that has now completely resolved.  Her brain MRI was negative for stroke.  She is cleared from neurological standpoint for discharge.  "

## 2017-11-09 NOTE — DISCHARGE PLANNING
Pt was discussed during rounds yesterday. It is anticipated she will DC home with no needs. SW remains available for any needs that may arise.

## 2017-11-09 NOTE — CARE PLAN
Yadi Saavedra Fall Risk Assessment:     Last Known Fall: Within the last month  Mobility: Dizziness/generalized weakness  Medications: No meds  Mental Status/LOC/Awareness: Awake, alert, and oriented to date, place, and person  Toileting Needs: No needs  Volume/Electrolyte Status: No problems  Communication/Sensory: Visual (Glasses)/hearing deficit  Behavior: Appropriate behavior  Yadi Saavedra Fall Risk Total: 8  Fall Risk Level: LOW RISK    Universal Fall Precautions:  call light/belongings in reach, bed in low position and locked, wheelchairs and assistive devices out of sight, siderails up x 2, use non-slip footwear, adequate lighting, clutter free and spill free environment, educate on level of risk, educate to call for assistance    Fall Risk Level Interventions:   TRIAL (TELE 8, NEURO, MED MARGARET 5) Low Fall Risk Interventions  Place yellow fall risk ID band on patient: verified  Provide patient/family education based on risk assessment: verified  Educate patient/family to call staff for assistance when getting out of bed: verified  Place fall precaution signage outside patient door: verified      Patient Specific Interventions:     Medication: review medications with patient and family  Mental Status/LOC/Awareness: not applicable  Toileting: provide frquent toileting  Volume/Electrolyte Status: ensure patient remains hydrated  Communication/Sensory: update plan of care on whiteboard  Behavioral: not applicable  Mobility: ensure bed is locked and in lowest position

## 2017-11-09 NOTE — DISCHARGE INSTRUCTIONS
Discharge Instructions    Discharged to home by car with relative. Discharged via wheelchair, hospital escort: Yes.  Special equipment needed: Not Applicable    Be sure to schedule a follow-up appointment with your primary care doctor or any specialists as instructed.     Discharge Plan:   Pneumococcal Vaccine Given - only chart on this line when given: Given (See MAR)  Influenza Vaccine Indication: Not indicated: Previously immunized this influenza season and > 8 years of age    I understand that a diet low in cholesterol, fat, and sodium is recommended for good health. Unless I have been given specific instructions below for another diet, I accept this instruction as my diet prescription.   Other diet: Heart Healthy    Special Instructions: None    · Is patient discharged on Warfarin / Coumadin?   No n    · Is patient Post Blood Transfusion?  No        Syncope  Syncope is a medical term for fainting or passing out. This means you lose consciousness and drop to the ground. People are generally unconscious for less than 5 minutes. You may have some muscle twitches for up to 15 seconds before waking up and returning to normal. Syncope occurs more often in older adults, but it can happen to anyone. While most causes of syncope are not dangerous, syncope can be a sign of a serious medical problem. It is important to seek medical care.   CAUSES   Syncope is caused by a sudden drop in blood flow to the brain. The specific cause is often not determined. Factors that can bring on syncope include:  · Taking medicines that lower blood pressure.  · Sudden changes in posture, such as standing up quickly.  · Taking more medicine than prescribed.  · Standing in one place for too long.  · Seizure disorders.  · Dehydration and excessive exposure to heat.  · Low blood sugar (hypoglycemia).  · Straining to have a bowel movement.  · Heart disease, irregular heartbeat, or other circulatory problems.  · Fear, emotional distress, seeing  blood, or severe pain.  SYMPTOMS   Right before fainting, you may:  · Feel dizzy or light-headed.  · Feel nauseous.  · See all white or all black in your field of vision.  · Have cold, clammy skin.  DIAGNOSIS   Your health care provider will ask about your symptoms, perform a physical exam, and perform an electrocardiogram (ECG) to record the electrical activity of your heart. Your health care provider may also perform other heart or blood tests to determine the cause of your syncope which may include:  · Transthoracic echocardiogram (TTE). During echocardiography, sound waves are used to evaluate how blood flows through your heart.  · Transesophageal echocardiogram (LAURITA).  · Cardiac monitoring. This allows your health care provider to monitor your heart rate and rhythm in real time.  · Holter monitor. This is a portable device that records your heartbeat and can help diagnose heart arrhythmias. It allows your health care provider to track your heart activity for several days, if needed.  · Stress tests by exercise or by giving medicine that makes the heart beat faster.  TREATMENT   In most cases, no treatment is needed. Depending on the cause of your syncope, your health care provider may recommend changing or stopping some of your medicines.  HOME CARE INSTRUCTIONS  · Have someone stay with you until you feel stable.  · Do not drive, use machinery, or play sports until your health care provider says it is okay.  · Keep all follow-up appointments as directed by your health care provider.  · Lie down right away if you start feeling like you might faint. Breathe deeply and steadily. Wait until all the symptoms have passed.  · Drink enough fluids to keep your urine clear or pale yellow.  · If you are taking blood pressure or heart medicine, get up slowly and take several minutes to sit and then stand. This can reduce dizziness.  SEEK IMMEDIATE MEDICAL CARE IF:   · You have a severe headache.  · You have unusual pain  in the chest, abdomen, or back.  · You are bleeding from your mouth or rectum, or you have black or tarry stool.  · You have an irregular or very fast heartbeat.  · You have pain with breathing.  · You have repeated fainting or seizure-like jerking during an episode.  · You faint when sitting or lying down.  · You have confusion.  · You have trouble walking.  · You have severe weakness.  · You have vision problems.  If you fainted, call your local emergency services (911 in [place] Do not drive yourself to the hospital.      This information is not intended to replace advice given to you by your health care provider. Make sure you discuss any questions you have with your health care provider.     Document Released: 12/18/2006 Document Revised: 05/03/2016 Document Reviewed: 02/15/2013  [employer] Interactive Patient Education ©2016 [employer]      Depression / Suicide Risk    As you are discharged from this [facility] facility, it is important to learn how to keep safe from harming yourself.    Recognize the warning signs:  · Abrupt changes in personality, positive or negative- including increase in energy   · Giving away possessions  · Change in eating patterns- significant weight changes-  positive or negative  · Change in sleeping patterns- unable to sleep or sleeping all the time   · Unwillingness or inability to communicate  · Depression  · Unusual sadness, discouragement and loneliness  · Talk of wanting to die  · Neglect of personal appearance   · Rebelliousness- reckless behavior  · Withdrawal from people/activities they love  · Confusion- inability to concentrate     If you or a loved one observes any of these behaviors or has concerns about self-harm, here's what you can do:  · Talk about it- your feelings and reasons for harming yourself  · Remove any means that you might use to hurt yourself (examples: pills, rope, extension cords, firearm)  · Get professional help from the community (Mental Health,  Substance Abuse, psychological counseling)  · Do not be alone:Call your Safe Contact- someone whom you trust who will be there for you.  · Call your local CRISIS HOTLINE 771-3205 or 430-290-5248  · Call your local Children's Mobile Crisis Response Team [place] (719) 157-0229 or www.LikeMe.Net  · Call the toll free National Suicide Prevention Hotlines   · National Suicide Prevention Lifeline 517-586-BQJN (7947)  · National Hope Line Network 800-SUICIDE (669-7142)

## 2017-11-09 NOTE — CARE PLAN
Problem: Safety  Goal: Will remain free from injury  Outcome: PROGRESSING AS EXPECTED  Education provided to patient on using the call bell and not to get up with out assistance.      Bed left in low position.  Bed and Chair alarms used.  Hourly rounding completed.  Personal Items left within reach.    Goal: Will remain free from falls  Outcome: PROGRESSING AS EXPECTED  Pt educated on the importance of CALL BEFORE YOU FALL.  Call bell and all necessary items in reach.  Hourly rounding focusing on the 3Ps completed by this RN with the assistance of Reyes GRAMAJO      Problem: Infection  Goal: Will remain free from infection  Outcome: PROGRESSING AS EXPECTED  Education provided on Antibiotics, Wound Care, Hand Washing and Labs as applicable to patient.

## 2017-11-09 NOTE — PROGRESS NOTES
Pt has been up and around the unit today, steady on feet.  Jovial and wishes to go home.  She has no complaints regarding pain or dizziness.    Ready for discharge with

## 2017-11-09 NOTE — PROGRESS NOTES
Discharged ambulatory with  and daughter in attendance.  Steady on feet, no c/o of dizziness.  At the [employer] desk on first, the , daughter and pt instructed me that they were fine from there on, Martha was going to walk to second level to get in car with daughter.

## 2017-11-09 NOTE — DISCHARGE SUMMARY
CHIEF COMPLAINT ON ADMISSION  Chief Complaint   Patient presents with   • Syncope       CODE STATUS  Full Code    HPI & HOSPITAL COURSE  This is a 72 y.o. female here with Dm, asthma admitted for N/V and syncope.     Syncope  Likely vasovagal response to vomiting and dehydration. Cardiac workup showed normal telemetry, TTE and carotid Us. Neurology was consulted for stroke and MRI brain was negative.    N/V  Likely viral gastroenteritis, resolved.    Therefore, she is discharged in good and stable condition with close outpatient follow-up.    SPECIFIC OUTPATIENT FOLLOW-UP  None    DISCHARGE PROBLEM LIST  Active Problems:    Meningioma (CMS-HCC) POA: Unknown  Resolved Problems:    Syncope POA: Unknown    Vomiting POA: Unknown      FOLLOW UP  No future appointments.  Pcp Pt States None            MEDICATIONS ON DISCHARGE   Martha Reyes   Home Medication Instructions MOOKIE:[id number]    Printed on:11/09/17 1430   Medication Information                      atorvastatin (LIPITOR) 10 MG Tab  Take 10 mg by mouth every evening.             fluticasone (FLONASE) 50 MCG/ACT nasal spray  Spray 2 Sprays in nose every day.             fluticasone-salmeterol (ADVAIR) 250-50 MCG/DOSE AEROSOL POWDER, BREATH ACTIVATED  Inhale 1 Puff by mouth every 12 hours.             Levocetirizine Dihydrochloride (XYZAL) 5 MG Tab  Take 5 mg by mouth every day.             levothyroxine (SYNTHROID) 112 MCG Tab  Take 112 mcg by mouth Every morning on an empty stomach.             linagliptin (TRADJENTA) 5 MG Tab tablet  Take 5 mg by mouth every day.             lisinopril (PRINIVIL) 10 MG Tab  Take 10 mg by mouth every day.                 DIET  Orders Placed This Encounter   Procedures   • Diet Order     Standing Status:   Standing     Number of Occurrences:   1     Order Specific Question:   Diet:     Answer:   Diabetic [3]       ACTIVITY  As tolerated.  Weight bearing as tolerated      CONSULTATIONS  Neurology    PROCEDURES  TTE  Carotid US  MRI  Brain    LABORATORY  Lab Results   Component Value Date/Time    SODIUM 136 11/09/2017 02:45 AM    POTASSIUM 3.9 11/09/2017 02:45 AM    CHLORIDE 107 11/09/2017 02:45 AM    CO2 21 11/09/2017 02:45 AM    GLUCOSE 101 (H) 11/09/2017 02:45 AM    BUN 20 11/09/2017 02:45 AM    CREATININE 1.31 11/09/2017 02:45 AM        Lab Results   Component Value Date/Time    WBC 7.0 11/08/2017 05:08 AM    HEMOGLOBIN 9.6 (L) 11/08/2017 05:08 AM    HEMATOCRIT 28.8 (L) 11/08/2017 05:08 AM    PLATELETCT 288 11/08/2017 05:08 AM        Total time of the discharge process exceeds 45 minutes

## 2019-06-07 ENCOUNTER — OFFICE VISIT (OUTPATIENT)
Dept: URGENT CARE | Facility: CLINIC | Age: 74
End: 2019-06-07
Payer: MEDICARE

## 2019-06-07 VITALS
WEIGHT: 138.4 LBS | RESPIRATION RATE: 16 BRPM | OXYGEN SATURATION: 98 % | SYSTOLIC BLOOD PRESSURE: 108 MMHG | DIASTOLIC BLOOD PRESSURE: 58 MMHG | BODY MASS INDEX: 22.24 KG/M2 | HEIGHT: 66 IN | HEART RATE: 80 BPM | TEMPERATURE: 99.6 F

## 2019-06-07 DIAGNOSIS — H60.311 ACUTE DIFFUSE OTITIS EXTERNA OF RIGHT EAR: ICD-10-CM

## 2019-06-07 DIAGNOSIS — H92.09 OTALGIA, UNSPECIFIED LATERALITY: ICD-10-CM

## 2019-06-07 DIAGNOSIS — H65.01 RIGHT ACUTE SEROUS OTITIS MEDIA, RECURRENCE NOT SPECIFIED: ICD-10-CM

## 2019-06-07 PROCEDURE — 99213 OFFICE O/P EST LOW 20 MIN: CPT | Performed by: NURSE PRACTITIONER

## 2019-06-07 RX ORDER — MULTIVIT WITH MINERALS/LUTEIN
TABLET ORAL
COMMUNITY
End: 2021-10-27

## 2019-06-07 RX ORDER — MONTELUKAST SODIUM 10 MG/1
10 TABLET ORAL DAILY
COMMUNITY

## 2019-06-07 RX ORDER — OFLOXACIN 3 MG/ML
5 SOLUTION AURICULAR (OTIC) 2 TIMES DAILY
Qty: 1 BOTTLE | Refills: 0 | Status: SHIPPED | OUTPATIENT
Start: 2019-06-07 | End: 2021-10-27

## 2019-06-07 RX ORDER — VITAMIN E 268 MG
409 CAPSULE ORAL DAILY
COMMUNITY
End: 2021-10-27

## 2019-06-07 RX ORDER — AMOXICILLIN 500 MG/1
500 CAPSULE ORAL 3 TIMES DAILY
Qty: 30 CAP | Refills: 0 | Status: SHIPPED | OUTPATIENT
Start: 2019-06-07 | End: 2019-06-17

## 2019-06-07 RX ORDER — AMPICILLIN TRIHYDRATE 250 MG
CAPSULE ORAL
COMMUNITY
End: 2021-10-27

## 2019-06-07 RX ORDER — PENICILLIN V POTASSIUM 250 MG/1
90 TABLET ORAL 4 TIMES DAILY
COMMUNITY
End: 2021-10-27

## 2019-06-07 RX ORDER — FERROUS GLUCONATE 324(38)MG
240 TABLET ORAL
COMMUNITY
End: 2021-10-27

## 2019-06-07 ASSESSMENT — ENCOUNTER SYMPTOMS
FEVER: 0
CHILLS: 0

## 2019-06-07 ASSESSMENT — PATIENT HEALTH QUESTIONNAIRE - PHQ9: CLINICAL INTERPRETATION OF PHQ2 SCORE: 0

## 2019-06-07 NOTE — PROGRESS NOTES
"Subjective:      Matrha Reyes is a 73 y.o. female who presents with Otalgia (Right ear, chronic pain  x1 day )     Past Medical History:   Diagnosis Date   • Asthma    • Carpal tunnel syndrome    • Diabetes (HCC)      Social History     Social History   • Marital status:      Spouse name: N/A   • Number of children: N/A   • Years of education: N/A     Occupational History   • Not on file.     Social History Main Topics   • Smoking status: Former Smoker   • Smokeless tobacco: Never Used   • Alcohol use Yes   • Drug use: No   • Sexual activity: Not on file     Other Topics Concern   • Not on file     Social History Narrative   • No narrative on file     History reviewed. No pertinent family history.    Allergies: Patient has no known allergies.    Patient is a 73-year-old female who presents today with complaint of right ear pain.  Symptoms started over the last 2 days.  States last night she felt congested in her sinuses but that has cleared.  No other upper respiratory symptoms at this time.          Otalgia    There is pain in the right ear. This is a new problem. The current episode started in the past 7 days. The problem occurs constantly. The problem has been unchanged. There has been no fever. She has tried nothing for the symptoms. The treatment provided no relief.       Review of Systems   Constitutional: Positive for malaise/fatigue. Negative for chills and fever.   HENT: Positive for ear pain.    Skin: Negative.    All other systems reviewed and are negative.         Objective:     /58 (BP Location: Right arm, Patient Position: Sitting, BP Cuff Size: Adult)   Pulse 80   Temp 37.6 °C (99.6 °F) (Temporal)   Resp 16   Ht 1.676 m (5' 6\")   Wt 62.8 kg (138 lb 6.4 oz)   SpO2 98%   BMI 22.34 kg/m²      Physical Exam   Constitutional: She is oriented to person, place, and time. She appears well-developed and well-nourished.   HENT:   Head: Normocephalic.   Left Ear: External ear normal.   Nose: " Nose normal.   Mouth/Throat: Oropharynx is clear and moist. No oropharyngeal exudate.   Right EAC red and inflamed  Right TM red   Eyes: Pupils are equal, round, and reactive to light. Conjunctivae and EOM are normal.   Neck: Normal range of motion. Neck supple.   Cardiovascular: Normal rate and regular rhythm.    Pulmonary/Chest: Effort normal and breath sounds normal.   Neurological: She is alert and oriented to person, place, and time.   Psychiatric: She has a normal mood and affect. Her behavior is normal. Thought content normal.   Vitals reviewed.              Assessment/Plan:     1. Otalgia, unspecified laterality  2. AOM right  3. Right OE    Floxin otic gtts  Amoxil  Tylenol PRN  Warm compresses  Follow up for persistent or worsening of symptoms.

## 2021-01-13 DIAGNOSIS — Z23 NEED FOR VACCINATION: ICD-10-CM

## 2021-10-26 ENCOUNTER — HOSPITAL ENCOUNTER (OUTPATIENT)
Facility: MEDICAL CENTER | Age: 76
End: 2021-10-26
Attending: EMERGENCY MEDICINE
Payer: MEDICARE

## 2021-10-26 ENCOUNTER — OFFICE VISIT (OUTPATIENT)
Dept: URGENT CARE | Facility: CLINIC | Age: 76
End: 2021-10-26
Payer: MEDICARE

## 2021-10-26 VITALS
HEIGHT: 66 IN | TEMPERATURE: 98.1 F | OXYGEN SATURATION: 97 % | SYSTOLIC BLOOD PRESSURE: 128 MMHG | BODY MASS INDEX: 23.3 KG/M2 | RESPIRATION RATE: 20 BRPM | WEIGHT: 145 LBS | DIASTOLIC BLOOD PRESSURE: 60 MMHG | HEART RATE: 74 BPM

## 2021-10-26 DIAGNOSIS — J06.9 UPPER RESPIRATORY TRACT INFECTION, UNSPECIFIED TYPE: ICD-10-CM

## 2021-10-26 DIAGNOSIS — Z79.52 CURRENT USE OF STEROID MEDICATION: ICD-10-CM

## 2021-10-26 PROCEDURE — U0005 INFEC AGEN DETEC AMPLI PROBE: HCPCS

## 2021-10-26 PROCEDURE — U0003 INFECTIOUS AGENT DETECTION BY NUCLEIC ACID (DNA OR RNA); SEVERE ACUTE RESPIRATORY SYNDROME CORONAVIRUS 2 (SARS-COV-2) (CORONAVIRUS DISEASE [COVID-19]), AMPLIFIED PROBE TECHNIQUE, MAKING USE OF HIGH THROUGHPUT TECHNOLOGIES AS DESCRIBED BY CMS-2020-01-R: HCPCS

## 2021-10-26 PROCEDURE — 99203 OFFICE O/P NEW LOW 30 MIN: CPT | Performed by: EMERGENCY MEDICINE

## 2021-10-26 RX ORDER — PREDNISOLONE SODIUM PHOSPHATE 30 MG/1
TABLET, ORALLY DISINTEGRATING ORAL DAILY
COMMUNITY
End: 2023-08-25

## 2021-10-26 RX ORDER — FUROSEMIDE 40 MG/1
40 TABLET ORAL DAILY
COMMUNITY

## 2021-10-27 LAB
COVID ORDER STATUS COVID19: NORMAL
SARS-COV-2 RNA RESP QL NAA+PROBE: NOTDETECTED
SPECIMEN SOURCE: NORMAL

## 2021-11-23 ENCOUNTER — HOSPITAL ENCOUNTER (EMERGENCY)
Facility: MEDICAL CENTER | Age: 76
End: 2021-11-23
Attending: EMERGENCY MEDICINE
Payer: MEDICARE

## 2021-11-23 VITALS
HEIGHT: 66 IN | RESPIRATION RATE: 16 BRPM | DIASTOLIC BLOOD PRESSURE: 53 MMHG | OXYGEN SATURATION: 97 % | TEMPERATURE: 98 F | SYSTOLIC BLOOD PRESSURE: 123 MMHG | HEART RATE: 71 BPM | BODY MASS INDEX: 25.44 KG/M2 | WEIGHT: 158.29 LBS

## 2021-11-23 DIAGNOSIS — S61.213A LACERATION OF LEFT MIDDLE FINGER WITHOUT FOREIGN BODY WITHOUT DAMAGE TO NAIL, INITIAL ENCOUNTER: ICD-10-CM

## 2021-11-23 PROCEDURE — 304217 HCHG IRRIGATION SYSTEM

## 2021-11-23 PROCEDURE — 700101 HCHG RX REV CODE 250: Performed by: EMERGENCY MEDICINE

## 2021-11-23 PROCEDURE — 90715 TDAP VACCINE 7 YRS/> IM: CPT | Performed by: EMERGENCY MEDICINE

## 2021-11-23 PROCEDURE — 700111 HCHG RX REV CODE 636 W/ 250 OVERRIDE (IP): Performed by: EMERGENCY MEDICINE

## 2021-11-23 PROCEDURE — 90471 IMMUNIZATION ADMIN: CPT

## 2021-11-23 PROCEDURE — 304999 HCHG REPAIR-SIMPLE/INTERMED LEVEL 1

## 2021-11-23 PROCEDURE — 99284 EMERGENCY DEPT VISIT MOD MDM: CPT

## 2021-11-23 PROCEDURE — 303747 HCHG EXTRA SUTURE

## 2021-11-23 RX ORDER — LIDOCAINE HYDROCHLORIDE 20 MG/ML
20 INJECTION, SOLUTION INFILTRATION; PERINEURAL ONCE
Status: COMPLETED | OUTPATIENT
Start: 2021-11-23 | End: 2021-11-23

## 2021-11-23 RX ADMIN — CLOSTRIDIUM TETANI TOXOID ANTIGEN (FORMALDEHYDE INACTIVATED), CORYNEBACTERIUM DIPHTHERIAE TOXOID ANTIGEN (FORMALDEHYDE INACTIVATED), BORDETELLA PERTUSSIS TOXOID ANTIGEN (GLUTARALDEHYDE INACTIVATED), BORDETELLA PERTUSSIS FILAMENTOUS HEMAGGLUTININ ANTIGEN (FORMALDEHYDE INACTIVATED), BORDETELLA PERTUSSIS PERTACTIN ANTIGEN, AND BORDETELLA PERTUSSIS FIMBRIAE 2/3 ANTIGEN 0.5 ML: 5; 2; 2.5; 5; 3; 5 INJECTION, SUSPENSION INTRAMUSCULAR at 21:24

## 2021-11-23 RX ADMIN — LIDOCAINE HYDROCHLORIDE 20 ML: 20 INJECTION, SOLUTION INFILTRATION; PERINEURAL at 21:30

## 2021-11-24 NOTE — ED TRIAGE NOTES
"Bib  for above complaints.     Chief Complaint   Patient presents with   • Laceration     laceration noted to left middle finger tonight while slicing cheese. pt is on steroids and eliquis. finger wrapped in triage.      /75   Pulse 73   Temp 36.7 °C (98.1 °F) (Temporal)   Resp 18   Ht 1.676 m (5' 6\")   Wt 71.8 kg (158 lb 4.6 oz)   SpO2 97%   Breastfeeding No   BMI 25.55 kg/m²     Pt vaccinated for covid.   "

## 2021-11-24 NOTE — ED NOTES
To bedside. Name provided to patient. Assessment completed. Gema well. Laceration noted to L middle finger.

## 2021-11-24 NOTE — ED NOTES
Patient is stable for discharge at this time, anticipatory guidance provided, close follow-up is encouraged, and ED return instructions have been detailed. Patient is  agreeable to the disposition and plan and discharged home in ambulatory state and in good condition.      Pt verbalized understanding.

## 2021-11-24 NOTE — ED PROVIDER NOTES
ED Provider Note    CHIEF COMPLAINT  Laceration    HPI  Martha Reyes is a 76 y.o. female who presents with complaint of a laceration on the left middle finger which occurred just prior to arrival.The patient sustained this injury by  Cutting it when cutting cheese. Tetanus vaccination status reviewed and not up to date    REVIEW OF SYSTEMS  See HPI for further details. All other systems are negative.     PAST MEDICAL HISTORY   has a past medical history of Asthma, Carpal tunnel syndrome, and Diabetes (HCC).    SOCIAL HISTORY  Social History     Tobacco Use   • Smoking status: Former Smoker   • Smokeless tobacco: Never Used   Vaping Use   • Vaping Use: Never used   Substance and Sexual Activity   • Alcohol use: Yes     Comment: occ   • Drug use: No   • Sexual activity: Not on file       SURGICAL HISTORY   has a past surgical history that includes thyroidectomy total; other orthopedic surgery; and gyn surgery.    CURRENT MEDICATIONS  Reviewed.  See Encounter Summary.  Include   Home Medications    Medication Sig Taking? Last Dose Authorizing Provider   prednisoLONE (ORAPRED ODT) 30 MG disintegrating tablet Take  by mouth every day.   Physician Outpatient   Insulin Degludec (TRESIBA SC) Inject  under the skin.   Physician Outpatient   furosemide (LASIX) 40 MG Tab Take 40 mg by mouth every day.   Physician Outpatient   Empagliflozin (JARDIANCE) 10 MG Tab Take  by mouth.   Physician Outpatient   montelukast (SINGULAIR) 10 MG Tab Take 10 mg by mouth every day.   Physician Outpatient   ASPIRIN PO Take  by mouth.   Physician Outpatient   lisinopril (PRINIVIL) 10 MG Tab Take 10 mg by mouth every day.   Physician Outpatient   levothyroxine (SYNTHROID) 112 MCG Tab Take 112 mcg by mouth Every morning on an empty stomach.   Physician Outpatient   atorvastatin (LIPITOR) 10 MG Tab Take 10 mg by mouth every evening.   Physician Outpatient   fluticasone-salmeterol (ADVAIR) 250-50 MCG/DOSE AEROSOL POWDER, BREATH ACTIVATED Inhale 1  "Puff by mouth every 12 hours.   Physician Outpatient         ALLERGIES  No Known Allergies    PHYSICAL EXAM  VITAL SIGNS: /75   Pulse 73   Temp 36.7 °C (98.1 °F) (Temporal)   Resp 18   Ht 1.676 m (5' 6\")   Wt 71.8 kg (158 lb 4.6 oz)   SpO2 97%   Breastfeeding No   BMI 25.55 kg/m²   Constitutional: Alert in no apparent distress.  HENT: Normocephalic, Atraumatic, Bilateral external ears normal. Nose normal.   Eyes: Pupils are equal and reactive. Conjunctiva normal, non-icteric.   Thorax & Lungs: Easy unlabored respirations  Abdomen:  No gross signs of peritonitis no pain with movement   Skin: 2 cm laceration on palmar surface of left middle finger  Extremities:  Neurovascular and tendon structures are intact.  Neurologic: Alert, Grossly non-focal.   Psychiatric: Affect and Mood normal      COURSE & MEDICAL DECISION MAKING  Pertinent Labs & Imaging studies reviewed. (See chart for details)    The underlying bone is unlikely broken because of the mechanism.   The wound is not contaminated and the risk of infection is low    Laceration Repair Procedure Note    Indication: Laceration    Procedure: The patient was placed in the appropriate position and anesthesia around the laceration was obtained by infiltration using 1% Lidocaine without epinephrine. The area was then irrigated with normal saline.  The laceration was viewed in a bloodless field with full range of motion and no tendon laceration or foreign bodies were visualized. The laceration was closed with 4-0 Ethilon using interrupted sutures. There were no additional lacerations requiring repair. The wound area was then dressed with bacitracin.      Total repaired wound length: 2 cm.     Other Items: Suture count: 3    The patient tolerated the procedure well.    Complications: None        Patient is instructed to have the sutures removed in the ER in 7-10 days. The patient needs to return immediately if there is any redness pus or drainage or signs of " infection otherwise they should follow the wound care information sheet     FINAL IMPRESSION  1. finger Laceration, 2 cm, status post suture repair     The patient was discharged home with an information sheet on laceration care and told to return immediately for any signs or symptoms listed, but specifically if any redness, drainage, pus or wound opening.  The follow-up plan is documented in EPIC and provided in writing to the patient.    Electronically signed by: Johana Rosa M.D., 11/23/2021 9:26 PM

## 2023-08-25 ENCOUNTER — OFFICE VISIT (OUTPATIENT)
Dept: URGENT CARE | Facility: CLINIC | Age: 78
End: 2023-08-25
Payer: MEDICARE

## 2023-08-25 VITALS
OXYGEN SATURATION: 97 % | DIASTOLIC BLOOD PRESSURE: 72 MMHG | HEART RATE: 82 BPM | SYSTOLIC BLOOD PRESSURE: 122 MMHG | TEMPERATURE: 97.5 F | BODY MASS INDEX: 24.43 KG/M2 | WEIGHT: 152 LBS | RESPIRATION RATE: 16 BRPM | HEIGHT: 66 IN

## 2023-08-25 DIAGNOSIS — U07.1 COVID-19 VIRUS INFECTION: ICD-10-CM

## 2023-08-25 DIAGNOSIS — J45.31 MILD PERSISTENT ASTHMA WITH EXACERBATION: ICD-10-CM

## 2023-08-25 DIAGNOSIS — R05.8 SPASMODIC COUGH: ICD-10-CM

## 2023-08-25 PROCEDURE — 99214 OFFICE O/P EST MOD 30 MIN: CPT | Performed by: NURSE PRACTITIONER

## 2023-08-25 PROCEDURE — 3074F SYST BP LT 130 MM HG: CPT | Performed by: NURSE PRACTITIONER

## 2023-08-25 PROCEDURE — 3078F DIAST BP <80 MM HG: CPT | Performed by: NURSE PRACTITIONER

## 2023-08-25 RX ORDER — PREDNISONE 20 MG/1
TABLET ORAL
Qty: 10 TABLET | Refills: 0 | Status: SHIPPED | OUTPATIENT
Start: 2023-08-25 | End: 2023-11-09

## 2023-08-25 RX ORDER — BENZONATATE 100 MG/1
100 CAPSULE ORAL 3 TIMES DAILY PRN
Qty: 60 CAPSULE | Refills: 0 | Status: SHIPPED | OUTPATIENT
Start: 2023-08-25 | End: 2023-11-09

## 2023-08-25 ASSESSMENT — ENCOUNTER SYMPTOMS: COUGH: 1

## 2023-08-25 NOTE — PROGRESS NOTES
"Subjective     Martha Reyes is a 77 y.o. female who presents with Cough (X1 week, body ache and extremely tired. Tested positive for Covid-19 this morning )            Cough  This is a new problem. Episode onset: pt reports new onset of cough that started about 6 days ago. +body aches, subjective fever. no SOB. couging worse with activity. The cough is Non-productive. She has tried OTC cough suppressant (mucinex, cough syrup) for the symptoms. The treatment provided mild relief. Her past medical history is significant for asthma.       Review of Systems   Respiratory:  Positive for cough.    All other systems reviewed and are negative.         Past Medical History:   Diagnosis Date    Asthma     Carpal tunnel syndrome     Diabetes (HCC)       Past Surgical History:   Procedure Laterality Date    GYN SURGERY      OTHER ORTHOPEDIC SURGERY      back    THYROIDECTOMY TOTAL        Social History     Socioeconomic History    Marital status:      Spouse name: Not on file    Number of children: Not on file    Years of education: Not on file    Highest education level: Not on file   Occupational History    Not on file   Tobacco Use    Smoking status: Former    Smokeless tobacco: Never   Vaping Use    Vaping Use: Never used   Substance and Sexual Activity    Alcohol use: Yes     Comment: occ    Drug use: No    Sexual activity: Not on file   Other Topics Concern    Not on file   Social History Narrative    Not on file     Social Determinants of Health     Financial Resource Strain: Not on file   Food Insecurity: Not on file   Transportation Needs: Not on file   Physical Activity: Not on file   Stress: Not on file   Social Connections: Not on file   Intimate Partner Violence: Not on file   Housing Stability: Not on file         Objective     /72   Pulse 82   Temp 36.4 °C (97.5 °F) (Temporal)   Resp 16   Ht 1.676 m (5' 6\")   Wt 68.9 kg (152 lb)   SpO2 97%   BMI 24.53 kg/m²      Physical Exam  Vitals and " nursing note reviewed.   Constitutional:       Appearance: Normal appearance. She is normal weight.   HENT:      Head: Normocephalic and atraumatic.      Nose: Nose normal.      Mouth/Throat:      Mouth: Mucous membranes are moist.      Pharynx: Oropharynx is clear.   Eyes:      Extraocular Movements: Extraocular movements intact.      Pupils: Pupils are equal, round, and reactive to light.   Cardiovascular:      Rate and Rhythm: Normal rate and regular rhythm.      Heart sounds: Normal heart sounds.   Pulmonary:      Effort: Pulmonary effort is normal.      Breath sounds: Normal breath sounds.   Musculoskeletal:         General: Normal range of motion.      Cervical back: Normal range of motion.   Skin:     General: Skin is warm and dry.      Capillary Refill: Capillary refill takes less than 2 seconds.   Neurological:      General: No focal deficit present.      Mental Status: She is alert and oriented to person, place, and time. Mental status is at baseline.   Psychiatric:         Mood and Affect: Mood normal.         Speech: Speech normal.         Thought Content: Thought content normal.         Judgment: Judgment normal.                             Assessment & Plan        1. COVID-19 virus infection  - benzonatate (TESSALON) 100 MG Cap; Take 1 Capsule by mouth 3 times a day as needed for Cough.  Dispense: 60 Capsule; Refill: 0  - predniSONE (DELTASONE) 20 MG Tab; Take 2 tabs PO daily for 5 days  Dispense: 10 Tablet; Refill: 0    2. Spasmodic cough    3. Mild persistent asthma with exacerbation  - predniSONE (DELTASONE) 20 MG Tab; Take 2 tabs PO daily for 5 days  Dispense: 10 Tablet; Refill: 0    She is outside the window of treatment for COVID based on the number of days of symptoms  She understands  Will treat cough with steroids and tessalon  Encouraged her to use her rescue inhaler as directed  She checks her BS daily, advised her to watch this closely and stop steroids if it becomes too elevated  Red flags  discussed and when to present to ER  Supportive care, differential diagnoses, and indications for immediate follow-up discussed with patient.    Pathogenesis of diagnosis discussed including typical length and natural progression.    Instructed to return to UC or nearest emergency department if symptoms fail to improve, for any change in condition, further concerns, or new concerning symptoms.  Patient states understanding of the plan of care and discharge instructions.

## 2023-09-12 ENCOUNTER — HOSPITAL ENCOUNTER (OUTPATIENT)
Dept: RADIOLOGY | Facility: MEDICAL CENTER | Age: 78
End: 2023-09-12
Attending: FAMILY MEDICINE
Payer: MEDICARE

## 2023-09-12 DIAGNOSIS — J12.82 PNEUMONIA DUE TO 2019 NOVEL CORONAVIRUS: ICD-10-CM

## 2023-09-12 DIAGNOSIS — U07.1 PNEUMONIA DUE TO 2019 NOVEL CORONAVIRUS: ICD-10-CM

## 2023-09-12 PROCEDURE — 71046 X-RAY EXAM CHEST 2 VIEWS: CPT

## 2023-11-08 SDOH — ECONOMIC STABILITY: FOOD INSECURITY: WITHIN THE PAST 12 MONTHS, THE FOOD YOU BOUGHT JUST DIDN'T LAST AND YOU DIDN'T HAVE MONEY TO GET MORE.: NEVER TRUE

## 2023-11-08 SDOH — ECONOMIC STABILITY: INCOME INSECURITY: IN THE LAST 12 MONTHS, WAS THERE A TIME WHEN YOU WERE NOT ABLE TO PAY THE MORTGAGE OR RENT ON TIME?: NO

## 2023-11-08 SDOH — ECONOMIC STABILITY: HOUSING INSECURITY
IN THE LAST 12 MONTHS, WAS THERE A TIME WHEN YOU DID NOT HAVE A STEADY PLACE TO SLEEP OR SLEPT IN A SHELTER (INCLUDING NOW)?: NO

## 2023-11-08 SDOH — ECONOMIC STABILITY: TRANSPORTATION INSECURITY
IN THE PAST 12 MONTHS, HAS LACK OF RELIABLE TRANSPORTATION KEPT YOU FROM MEDICAL APPOINTMENTS, MEETINGS, WORK OR FROM GETTING THINGS NEEDED FOR DAILY LIVING?: NO

## 2023-11-08 SDOH — HEALTH STABILITY: PHYSICAL HEALTH: ON AVERAGE, HOW MANY DAYS PER WEEK DO YOU ENGAGE IN MODERATE TO STRENUOUS EXERCISE (LIKE A BRISK WALK)?: 0 DAYS

## 2023-11-08 SDOH — HEALTH STABILITY: MENTAL HEALTH
STRESS IS WHEN SOMEONE FEELS TENSE, NERVOUS, ANXIOUS, OR CAN'T SLEEP AT NIGHT BECAUSE THEIR MIND IS TROUBLED. HOW STRESSED ARE YOU?: ONLY A LITTLE

## 2023-11-08 SDOH — ECONOMIC STABILITY: TRANSPORTATION INSECURITY
IN THE PAST 12 MONTHS, HAS LACK OF TRANSPORTATION KEPT YOU FROM MEETINGS, WORK, OR FROM GETTING THINGS NEEDED FOR DAILY LIVING?: NO

## 2023-11-08 SDOH — ECONOMIC STABILITY: HOUSING INSECURITY: IN THE LAST 12 MONTHS, HOW MANY PLACES HAVE YOU LIVED?: 2

## 2023-11-08 SDOH — ECONOMIC STABILITY: FOOD INSECURITY: WITHIN THE PAST 12 MONTHS, YOU WORRIED THAT YOUR FOOD WOULD RUN OUT BEFORE YOU GOT MONEY TO BUY MORE.: NEVER TRUE

## 2023-11-08 SDOH — HEALTH STABILITY: PHYSICAL HEALTH: ON AVERAGE, HOW MANY MINUTES DO YOU ENGAGE IN EXERCISE AT THIS LEVEL?: 20 MIN

## 2023-11-08 SDOH — ECONOMIC STABILITY: TRANSPORTATION INSECURITY
IN THE PAST 12 MONTHS, HAS THE LACK OF TRANSPORTATION KEPT YOU FROM MEDICAL APPOINTMENTS OR FROM GETTING MEDICATIONS?: NO

## 2023-11-08 ASSESSMENT — LIFESTYLE VARIABLES
HOW MANY STANDARD DRINKS CONTAINING ALCOHOL DO YOU HAVE ON A TYPICAL DAY: 1 OR 2
HOW OFTEN DO YOU HAVE SIX OR MORE DRINKS ON ONE OCCASION: NEVER
SKIP TO QUESTIONS 9-10: 1
AUDIT-C TOTAL SCORE: 3
HOW OFTEN DO YOU HAVE A DRINK CONTAINING ALCOHOL: 2-3 TIMES A WEEK

## 2023-11-08 ASSESSMENT — SOCIAL DETERMINANTS OF HEALTH (SDOH)
DO YOU BELONG TO ANY CLUBS OR ORGANIZATIONS SUCH AS CHURCH GROUPS UNIONS, FRATERNAL OR ATHLETIC GROUPS, OR SCHOOL GROUPS?: NO
WITHIN THE PAST 12 MONTHS, YOU WORRIED THAT YOUR FOOD WOULD RUN OUT BEFORE YOU GOT THE MONEY TO BUY MORE: NEVER TRUE
HOW OFTEN DO YOU GET TOGETHER WITH FRIENDS OR RELATIVES?: ONCE A WEEK
HOW MANY DRINKS CONTAINING ALCOHOL DO YOU HAVE ON A TYPICAL DAY WHEN YOU ARE DRINKING: 1 OR 2
HOW OFTEN DO YOU GET TOGETHER WITH FRIENDS OR RELATIVES?: ONCE A WEEK
HOW OFTEN DO YOU ATTENT MEETINGS OF THE CLUB OR ORGANIZATION YOU BELONG TO?: NEVER
IN A TYPICAL WEEK, HOW MANY TIMES DO YOU TALK ON THE PHONE WITH FAMILY, FRIENDS, OR NEIGHBORS?: MORE THAN THREE TIMES A WEEK
HOW OFTEN DO YOU HAVE A DRINK CONTAINING ALCOHOL: 2-3 TIMES A WEEK
DO YOU BELONG TO ANY CLUBS OR ORGANIZATIONS SUCH AS CHURCH GROUPS UNIONS, FRATERNAL OR ATHLETIC GROUPS, OR SCHOOL GROUPS?: NO
IN A TYPICAL WEEK, HOW MANY TIMES DO YOU TALK ON THE PHONE WITH FAMILY, FRIENDS, OR NEIGHBORS?: MORE THAN THREE TIMES A WEEK
HOW OFTEN DO YOU HAVE SIX OR MORE DRINKS ON ONE OCCASION: NEVER
HOW OFTEN DO YOU ATTENT MEETINGS OF THE CLUB OR ORGANIZATION YOU BELONG TO?: NEVER

## 2023-11-09 ENCOUNTER — OFFICE VISIT (OUTPATIENT)
Dept: MEDICAL GROUP | Facility: MEDICAL CENTER | Age: 78
End: 2023-11-09
Payer: MEDICARE

## 2023-11-09 VITALS
OXYGEN SATURATION: 96 % | DIASTOLIC BLOOD PRESSURE: 68 MMHG | TEMPERATURE: 98.6 F | SYSTOLIC BLOOD PRESSURE: 124 MMHG | HEIGHT: 66 IN | HEART RATE: 89 BPM | WEIGHT: 164 LBS | BODY MASS INDEX: 26.36 KG/M2

## 2023-11-09 DIAGNOSIS — E16.2 HYPOGLYCEMIA: ICD-10-CM

## 2023-11-09 DIAGNOSIS — E11.21 TYPE 2 DIABETES WITH NEPHROPATHY (HCC): ICD-10-CM

## 2023-11-09 DIAGNOSIS — R55 SYNCOPE, UNSPECIFIED SYNCOPE TYPE: ICD-10-CM

## 2023-11-09 DIAGNOSIS — I48.0 PAROXYSMAL A-FIB (HCC): ICD-10-CM

## 2023-11-09 DIAGNOSIS — Z00.00 ENCOUNTER FOR MEDICAL EXAMINATION TO ESTABLISH CARE: Primary | ICD-10-CM

## 2023-11-09 DIAGNOSIS — H61.23 BILATERAL IMPACTED CERUMEN: ICD-10-CM

## 2023-11-09 PROBLEM — E55.9 VITAMIN D DEFICIENCY: Status: ACTIVE | Noted: 2021-05-03

## 2023-11-09 PROBLEM — I65.21 STENOSIS OF RIGHT CAROTID ARTERY: Status: ACTIVE | Noted: 2022-05-02

## 2023-11-09 PROBLEM — I73.9 INTERMITTENT CLAUDICATION (HCC): Status: ACTIVE | Noted: 2022-11-14

## 2023-11-09 PROBLEM — G45.9 TIA (TRANSIENT ISCHEMIC ATTACK): Status: ACTIVE | Noted: 2022-06-13

## 2023-11-09 PROBLEM — D64.9 NORMOCYTIC ANEMIA: Status: ACTIVE | Noted: 2022-06-13

## 2023-11-09 PROBLEM — I10 ESSENTIAL (PRIMARY) HYPERTENSION: Status: ACTIVE | Noted: 2021-05-03

## 2023-11-09 PROBLEM — E78.2 MIXED HYPERLIPIDEMIA: Status: ACTIVE | Noted: 2021-05-03

## 2023-11-09 PROBLEM — N18.30 CHRONIC RENAL INSUFFICIENCY, STAGE III (MODERATE): Status: ACTIVE | Noted: 2021-05-03

## 2023-11-09 PROBLEM — E89.0 POSTPROCEDURAL HYPOTHYROIDISM: Status: ACTIVE | Noted: 2021-05-03

## 2023-11-09 PROBLEM — M31.6 GIANT CELL ARTERITIS (HCC): Status: ACTIVE | Noted: 2022-05-02

## 2023-11-09 PROBLEM — J45.40 MODERATE PERSISTENT ASTHMA WITHOUT COMPLICATION: Status: ACTIVE | Noted: 2021-05-03

## 2023-11-09 LAB
HBA1C MFR BLD: 6.7 % (ref ?–5.8)
POCT INT CON NEG: NEGATIVE
POCT INT CON POS: POSITIVE

## 2023-11-09 PROCEDURE — 3074F SYST BP LT 130 MM HG: CPT | Performed by: FAMILY MEDICINE

## 2023-11-09 PROCEDURE — 3078F DIAST BP <80 MM HG: CPT | Performed by: FAMILY MEDICINE

## 2023-11-09 PROCEDURE — 99214 OFFICE O/P EST MOD 30 MIN: CPT | Performed by: FAMILY MEDICINE

## 2023-11-09 PROCEDURE — 83036 HEMOGLOBIN GLYCOSYLATED A1C: CPT | Performed by: FAMILY MEDICINE

## 2023-11-09 RX ORDER — LISINOPRIL 20 MG/1
20 TABLET ORAL DAILY
COMMUNITY
Start: 2023-11-03

## 2023-11-09 RX ORDER — CHOLECALCIFEROL (VITAMIN D3) 125 MCG
50 CAPSULE ORAL DAILY
COMMUNITY

## 2023-11-09 RX ORDER — MULTIVIT-MIN/FOLIC AC/COLLAGEN 0.2MG-25MG
1950 TABLET,CHEWABLE ORAL 2 TIMES DAILY
COMMUNITY

## 2023-11-09 RX ORDER — FLASH GLUCOSE SCANNING READER
1 EACH MISCELLANEOUS DAILY
Qty: 1 EACH | Refills: 0 | Status: SHIPPED | OUTPATIENT
Start: 2023-11-09 | End: 2023-11-09

## 2023-11-09 RX ORDER — FLASH GLUCOSE SENSOR
1 KIT MISCELLANEOUS
Qty: 9 EACH | Refills: 11 | Status: SHIPPED | OUTPATIENT
Start: 2023-11-09 | End: 2023-11-09

## 2023-11-09 RX ORDER — GLUCOSAMINE/D3/BOSWELLIA SERRA 1500MG-400
10000 TABLET ORAL DAILY
COMMUNITY

## 2023-11-09 RX ORDER — INSULIN LISPRO-AABC 100 [IU]/ML
INJECTION, SOLUTION SUBCUTANEOUS
COMMUNITY
Start: 2023-10-14

## 2023-11-09 RX ORDER — APIXABAN 5 MG/1
5 TABLET, FILM COATED ORAL 2 TIMES DAILY
COMMUNITY
Start: 2023-10-05

## 2023-11-09 RX ORDER — EMPAGLIFLOZIN 25 MG/1
1 TABLET, FILM COATED ORAL DAILY
COMMUNITY
Start: 2023-11-03

## 2023-11-09 RX ORDER — FLASH GLUCOSE SCANNING READER
1 EACH MISCELLANEOUS DAILY
Qty: 1 EACH | Refills: 0 | Status: SHIPPED | OUTPATIENT
Start: 2023-11-09 | End: 2023-11-27

## 2023-11-09 RX ORDER — MULTIVIT WITH MINERALS/LUTEIN
TABLET ORAL
COMMUNITY

## 2023-11-09 RX ORDER — INSULIN DEGLUDEC 100 U/ML
INJECTION, SOLUTION SUBCUTANEOUS
COMMUNITY
Start: 2023-10-27

## 2023-11-09 RX ORDER — FLASH GLUCOSE SENSOR
1 KIT MISCELLANEOUS
Qty: 9 EACH | Refills: 11 | Status: SHIPPED | OUTPATIENT
Start: 2023-11-09 | End: 2023-11-27

## 2023-11-09 RX ORDER — VITAMIN E 268 MG
180 CAPSULE ORAL DAILY
COMMUNITY

## 2023-11-09 RX ORDER — ATORVASTATIN CALCIUM 40 MG/1
40 TABLET, FILM COATED ORAL DAILY
COMMUNITY
Start: 2023-11-03

## 2023-11-09 ASSESSMENT — FIBROSIS 4 INDEX: FIB4 SCORE: 1.06

## 2023-11-09 NOTE — LETTER
[facility]  Catalina Keller A.P.R.NShirley  [address]  [place] [address]  Fax: [phone]   Authorization for Release/Disclosure of   Protected Health Information   Name: MARBELLA REYES : 1945 SSN: [id number]   Address: [facility] [place] [address] Phone:    [phone] (home)    I authorize the entity listed below to release/disclose the PHI below to:   [facility]/CARLOS Goins and [employer]   Provider or Entity Name:     Address   City, State, Zip   Phone:      Fax:     Reason for request: continuity of care   Information to be released:    [  ] LAST COLONOSCOPY,  including any PATH REPORT and follow-up  [  ] LAST FIT/COLOGUARD RESULT [  ] LAST DEXA  [  ] LAST MAMMOGRAM  [  ] LAST PAP  [  ] LAST LABS [  ] RETINA EXAM REPORT  [  ] IMMUNIZATION RECORDS  [ X] Release all info      [  ] Check here and initial the line next to each item to release ALL health information INCLUDING  _____ Care and treatment for drug and / or alcohol abuse  _____ HIV testing, infection status, or AIDS  _____ Genetic Testing    DATES OF SERVICE OR TIME PERIOD TO BE DISCLOSED: _____________  I understand and acknowledge that:  * This Authorization may be revoked at any time by you in writing, except if your health information has already been used or disclosed.  * Your health information that will be used or disclosed as a result of you signing this authorization could be re-disclosed by the recipient. If this occurs, your re-disclosed health information may no longer be protected by State or Federal laws.  * You may refuse to sign this Authorization. Your refusal will not affect your ability to obtain treatment.  * This Authorization becomes effective upon signing and will  on (date) __________.      If no date is indicated, this Authorization will  one (1) year from the signature date.    Name: Marbella Reyes  Signature: Date:   2023     PLEASE FAX REQUESTED  RECORDS BACK TO: [phone]

## 2023-11-09 NOTE — LETTER
[facility]  Catalina Keller A.P.R.NShirley  [address]  [place] [address]  Fax: [phone]   Authorization for Release/Disclosure of   Protected Health Information   Name: MARBELLA REYES : 1945 SSN: [id number]   Address: [facility] [place] [address] Phone:    [phone] (home)    I authorize the entity listed below to release/disclose the PHI below to:   [facility]/CARLOS Goins and CARLOS Goins   Provider or Entity Name:     Address   City, State, Zip   Phone:      Fax:     Reason for request: continuity of care   Information to be released:    [  ] LAST COLONOSCOPY,  including any PATH REPORT and follow-up  [  ] LAST FIT/COLOGUARD RESULT [  ] LAST DEXA  [  ] LAST MAMMOGRAM  [  ] LAST PAP  [  ] LAST LABS [  ] RETINA EXAM REPORT  [  ] IMMUNIZATION RECORDS  [ X] Release all info      [  ] Check here and initial the line next to each item to release ALL health information INCLUDING  _____ Care and treatment for drug and / or alcohol abuse  _____ HIV testing, infection status, or AIDS  _____ Genetic Testing    DATES OF SERVICE OR TIME PERIOD TO BE DISCLOSED: _____________  I understand and acknowledge that:  * This Authorization may be revoked at any time by you in writing, except if your health information has already been used or disclosed.  * Your health information that will be used or disclosed as a result of you signing this authorization could be re-disclosed by the recipient. If this occurs, your re-disclosed health information may no longer be protected by State or Federal laws.  * You may refuse to sign this Authorization. Your refusal will not affect your ability to obtain treatment.  * This Authorization becomes effective upon signing and will  on (date) __________.      If no date is indicated, this Authorization will  one (1) year from the signature date.    Name: Marbella Reyes  Signature: Date:   2023     PLEASE FAX REQUESTED  RECORDS BACK TO: [phone]

## 2023-11-09 NOTE — PATIENT INSTRUCTIONS
[email or web address]    https://www.youtube.com/watch?v=Svq2zu1mCBh     [email or web address]

## 2023-11-10 NOTE — ASSESSMENT & PLAN NOTE
Subacute.  I do suspect her syncope episodes may be related to hypoglycemic events.  Recommended follow-up with endocrinologist in [place].  Ordered freestyle continuous glucometer to monitor when her sugars dip and when to alert the patient that her sugars are low to avoid any future syncopal events.  Discussed possibly backing down on her insulin boluses during the day, discussed making sure she is getting plenty of protein.

## 2023-11-10 NOTE — PROGRESS NOTES
Martha Reyes is a pleasant 78 y.o. female here to establish care.    HPI:   Problem   Encounter for Medical Examination to Establish Care    Martha is a 78-year-old female who is here to establish care.  Has been having episodes of syncope and hypoglycemia, type 2 diabetes with fluctuations in her sugars.     Intermittent Claudication (Hcc)    Last Assessment & Plan:   Formatting of this note might be different from the original.  Resolved. She is s/p angioplasty in September 2022. She sees Dr. Tineo for management.     Tia (Transient Ischemic Attack)    Last Assessment & Plan:   Formatting of this note might be different from the original.  S/p R CEA with f/u with vascular in [facility]. She continues baby aspirin (in addition to Eliquis for a fib).  Doing well.     Normocytic Anemia    Last Assessment & Plan:   Formatting of this note might be different from the original.  Continue current supplementation.     Stenosis of Right Carotid Artery    Last Assessment & Plan:   Formatting of this note might be different from the original.  S/p CEA 11/2021.  Currently on baby aspirin and Eliquis.     Giant Cell Arteritis (Hcc)    Last Assessment & Plan:   Formatting of this note might be different from the original.  S/P LLE angioplasty 9/27/22 with Dr. Montes.  She is doing very well it is pleased that she is not able to walk without limitations.    Last Assessment & Plan:   Formatting of this note might be different from the original.  Will f/u with ophthalmologist in December.  On low-dose prednisone currently longstanding.  Having  issues when she is out in bright light limited to the right eye     Paroxysmal A-Fib (Hcc)    Follow with Cardiology at [facility]  Just completed a heart monitor after her syncopal events.  Taking Eliquis and Aspirin.  When she goes in and out of atrial fibrillation, she denies any symptoms.  Last episode was recently that occurred at night.     Vitamin D Deficiency   Type 2 Diabetes With  Nephropathy (Hcc)    Usng sliding scale and long acting insulin.  Followed with Endo in [facility].  Was taking oempic and with her A1C controlled taken off her Ozempic.  Reports that her sugars are good in the am and increase throughout the day.  Had COVID in August lost taste.  Reports syncopal events with low blood glucose.     Postprocedural Hypothyroidism    Last Assessment & Plan:   Formatting of this note might be different from the original.  Clinically euthyroid. TSH is WNL. She is tolerating it well. Continue current management.     Moderate Persistent Asthma Without Complication    Last Assessment & Plan:   Formatting of this note might be different from the original.  Stable. Avoid triggers. Continue with the current management. We will continue to monitor the symptoms.     Mixed Hyperlipidemia    Last Assessment & Plan:   Formatting of this note might be different from the original.  Excellent control. Lipid levels are WNL.  Continue with the current medication regimen. No additional treatment recommended. Advised to optimize diet and exercise 5 days a week for at least 30 minutes as able. We will recheck lipids prior to next visit.     Essential (Primary) Hypertension    Last Assessment & Plan:   Formatting of this note might be different from the original.  BP is stable. Continue to monitor BP at home and continue with the current management. Continue to monitor BP at home periodically and maintain BP log. Please bring BP log at next visit.     Chronic Renal Insufficiency, Stage III (Moderate) (Hcc)    Last Assessment & Plan:   Formatting of this note might be different from the original.  Stable. Most recent BMP shows elevated creatinine at 1.27 and low eGFR at 43. We will continue to monitor status recheck levels prior to next visit.     Syncope    Was out with a friend for lunch went to drive home and then doesn't know what happened.  Sitting in the car and feels like she should eat  something.  Someone went to see if she was okay, was dragged out and called 911.  Was given oral glucose and water.  BS down to 60, 66 after glucose.  This had occurred prior, when she had syncope 2 weeks before.  Bent over, looked up and it was black.  Next thing knew she was in the vegetable boxes.  3 days later her vision started to go, sat down and ate something.  A few weeks later at [employer] was stairing at the [place].  Had checked her glucose 66.  BS increased to 400 at night and the next morning will be 100 the next am.             Current medicines (including changes today)  Current Outpatient Medications   Medication Sig Dispense Refill    JARDIANCE 25 MG Tab Take 1 Tablet by mouth every day.      TRESIBA FLEXTOUCH 100 UNIT/ML Solution Pen-injector INJECT 20 UNITS SUBCUTANEOUSLY ONCE DAILY      lisinopril (PRINIVIL) 20 MG Tab Take 20 mg by mouth every day.      LYUMJEV KWIKPEN 100 UNIT/ML Solution Pen-injector INJECT 3 UNITS THREE TIMES DAILY WITH MEALS      atorvastatin (LIPITOR) 40 MG Tab Take 40 mg by mouth every day.      ELIQUIS 5 MG Tab Take 5 mg by mouth 2 times a day.      Biotin 69159 MCG Tab Take 10,000 mcg by mouth every day.      Cholecalciferol (VITAMIN D3) 50 MCG (2000 UT) Tab Take 50 mcg by mouth every day.      Calcium Carbonate (CALCIUM 600 PO) Take 600 mg by mouth every day.      Multiple Vitamins-Minerals (CENTRUM SILVER 50+WOMEN PO) Take  by mouth.      vitamin E 180 MG (400 UNIT) Cap Take 180 mg by mouth every day.      Ascorbic Acid (VITAMIN C) 1000 MG Tab Take  by mouth.      Turmeric-Mee 150-25 MG Chew Tab Chew 1,950 mg 2 times a day.      Ferrous Gluconate 239 (27 Fe) MG Tab Take 27 mg by mouth every day.      Continuous Blood Gluc  (FREESTYLE JEFFREY 14 DAY READER) Device 1 Each every day. 1 Each 0    Continuous Blood Gluc Sensor (FREESTYLE JEFFREY 14 DAY SENSOR) Misc 1 Each every 10 days. 9 Each 11    ASPIRIN PO Take 81 mg by mouth every day.      levothyroxine (SYNTHROID)  "112 MCG Tab Take 88 mcg by mouth every morning on an empty stomach.      fluticasone-salmeterol (ADVAIR) 250-50 MCG/DOSE AEROSOL POWDER, BREATH ACTIVATED Inhale 1 Puff by mouth every 12 hours.      furosemide (LASIX) 40 MG Tab Take 40 mg by mouth every day.      montelukast (SINGULAIR) 10 MG Tab Take 10 mg by mouth every day.       No current facility-administered medications for this visit.       Past Medical/ Surgical History  She  has a past medical history of Asthma, Carpal tunnel syndrome, and Diabetes (HCC).  She  has a past surgical history that includes thyroidectomy total; other orthopedic surgery; gyn surgery; abdominal hysterectomy total; primary c section; eye surgery; and laminotomy.    Social History  Social History     Tobacco Use    Smoking status: Former     Types: Cigarettes    Smokeless tobacco: Never   Vaping Use    Vaping Use: Never used   Substance Use Topics    Alcohol use: Yes     Comment: occ    Drug use: No     Social History     Social History Narrative    Not on file        Family History  Family History   Problem Relation Age of Onset    Cancer Mother         Non HOd lymph    Lung Disease Father         lung + SMK    Cancer Father         lung    Other Sister         encephalitis     Family Status   Relation Name Status    Mo  (Not Specified)    Fa  (Not Specified)    Sis  (Not Specified)        Objective:     /68 (BP Location: Left arm, Patient Position: Sitting, BP Cuff Size: Adult)   Pulse 89   Temp 37 °C (98.6 °F) (Temporal)   Ht 1.676 m (5' 6\")   Wt 74.4 kg (164 lb)   SpO2 96%  Body mass index is 26.47 kg/m².    Physical Exam  Constitutional:       General: She is not in acute distress.  HENT:      Head: Normocephalic and atraumatic.      Right Ear: Tympanic membrane and external ear normal.      Left Ear: Tympanic membrane and external ear normal.      Nose: No nasal deformity.      Mouth/Throat:      Lips: Pink.      Mouth: Mucous membranes are moist.      Pharynx: " Oropharynx is clear. Uvula midline. No posterior oropharyngeal erythema.   Eyes:      General: Lids are normal.      Extraocular Movements: Extraocular movements intact.      Conjunctiva/sclera: Conjunctivae normal.      Pupils: Pupils are equal, round, and reactive to light.   Neck:      Trachea: Trachea normal.   Cardiovascular:      Rate and Rhythm: Normal rate and regular rhythm.      Heart sounds: Normal heart sounds. No murmur heard.     No friction rub. No gallop.   Pulmonary:      Effort: Pulmonary effort is normal. No accessory muscle usage.      Breath sounds: Normal breath sounds. No wheezing or rales.   Abdominal:      General: Bowel sounds are normal.      Palpations: Abdomen is soft.      Tenderness: There is no abdominal tenderness.   Musculoskeletal:      Cervical back: Normal range of motion and neck supple.      Right lower leg: No edema.      Left lower leg: No edema.   Lymphadenopathy:      Cervical: No cervical adenopathy.   Skin:     General: Skin is warm and dry.      Findings: No rash.   Neurological:      General: No focal deficit present.      Mental Status: She is alert and oriented to person, place, and time. Mental status is at baseline.      GCS: GCS eye subscore is 4. GCS verbal subscore is 5. GCS motor subscore is 6.      Motor: No weakness.      Gait: Gait is intact.   Psychiatric:         Attention and Perception: Attention normal.         Mood and Affect: Mood and affect normal.         Speech: Speech normal.          Imaging:  No imaging    Labs:  11/09/2023 POC A1C: 6.7%  Assessment and Plan:   The following treatment plan was discussed     Problem List Items Addressed This Visit       Syncope     Subacute.  I do suspect her syncope episodes may be related to hypoglycemic events.  Recommended follow-up with endocrinologist in [place].  Ordered freestyle continuous glucometer to monitor when her sugars dip and when to alert the patient that her sugars are low to avoid any  future syncopal events.  Discussed possibly backing down on her insulin boluses during the day, discussed making sure she is getting plenty of protein.         Relevant Medications    Continuous Blood Gluc  (FREESTYLE JEFFREY 14 DAY READER) Device    Continuous Blood Gluc Sensor (FREESTYLE JEFFREY 14 DAY SENSOR) [facility]    Type 2 diabetes with nephropathy (HCC)     Chronic, unstable.  POCT A1c at 6.7%.  Reduce her short acting insulin.  Ordered continuous glucometer.         Relevant Medications    JARDIANCE 25 MG Tab    TRESIBA FLEXTOUCH 100 UNIT/ML Solution Pen-injector    LYUMJEV KWIKPEN 100 UNIT/ML Solution Pen-injector    Continuous Blood Gluc  (FREESTYLE JEFFREY 14 DAY READER) Device    Continuous Blood Gluc Sensor (FREESTYLE JEFFREY 14 DAY SENSOR) Misc    Other Relevant Orders    POCT  A1C (Completed)    Paroxysmal A-fib (HCC)     Chronic, stable.  Continue to follow with cardiology.  Continue Eliquis 5 mg twice daily and aspirin 81 mg once daily.         Relevant Medications    lisinopril (PRINIVIL) 20 MG Tab    atorvastatin (LIPITOR) 40 MG Tab    ELIQUIS 5 MG Tab    Encounter for medical examination to establish care - Primary     Other Visit Diagnoses       Bilateral impacted cerumen        Relevant Orders    Ear Irrigation (MA Only)    Hypoglycemia        Relevant Medications    Continuous Blood Gluc  (FREESTYLE JEFFREY 14 DAY READER) Device    Continuous Blood Gluc Sensor (FREESTYLE JEFFREY 14 DAY SENSOR) [facility]             Records requested.  Followup: Return in about 6 months (around 5/9/2024) for Follow-up.    I have placed POCT A1C orders.  The MA is preforming POCT A1C orders under the direction of Dr. Lopez    Please note that this dictation was created using voice recognition software. I have made every reasonable attempt to correct obvious errors, but I expect that there are errors of grammar and possibly content that I did not discover before finalizing the note.

## 2023-11-10 NOTE — ASSESSMENT & PLAN NOTE
Chronic, unstable.  POCT A1c at 6.7%.  Reduce her short acting insulin.  Ordered continuous glucometer.

## 2023-11-10 NOTE — ASSESSMENT & PLAN NOTE
Chronic, stable.  Continue to follow with cardiology.  Continue Eliquis 5 mg twice daily and aspirin 81 mg once daily.

## 2023-11-27 DIAGNOSIS — E11.21 TYPE 2 DIABETES WITH NEPHROPATHY (HCC): ICD-10-CM

## 2023-11-27 RX ORDER — BLOOD-GLUCOSE SENSOR
1 EACH MISCELLANEOUS
Qty: 6 EACH | Refills: 3 | Status: SHIPPED | OUTPATIENT
Start: 2023-11-27

## 2024-05-28 ENCOUNTER — APPOINTMENT (OUTPATIENT)
Dept: MEDICAL GROUP | Facility: MEDICAL CENTER | Age: 79
End: 2024-05-28
Payer: MEDICARE